# Patient Record
Sex: FEMALE | Race: BLACK OR AFRICAN AMERICAN | NOT HISPANIC OR LATINO
[De-identification: names, ages, dates, MRNs, and addresses within clinical notes are randomized per-mention and may not be internally consistent; named-entity substitution may affect disease eponyms.]

---

## 2021-02-09 ENCOUNTER — RESULT REVIEW (OUTPATIENT)
Age: 81
End: 2021-02-09

## 2021-02-09 ENCOUNTER — INPATIENT (INPATIENT)
Facility: HOSPITAL | Age: 81
LOS: 5 days | Discharge: HOME | End: 2021-02-15
Attending: STUDENT IN AN ORGANIZED HEALTH CARE EDUCATION/TRAINING PROGRAM | Admitting: STUDENT IN AN ORGANIZED HEALTH CARE EDUCATION/TRAINING PROGRAM
Payer: MEDICARE

## 2021-02-09 VITALS
HEART RATE: 101 BPM | HEIGHT: 61 IN | TEMPERATURE: 99 F | OXYGEN SATURATION: 97 % | RESPIRATION RATE: 18 BRPM | DIASTOLIC BLOOD PRESSURE: 97 MMHG | WEIGHT: 130.07 LBS | SYSTOLIC BLOOD PRESSURE: 124 MMHG

## 2021-02-09 DIAGNOSIS — K56.609 UNSPECIFIED INTESTINAL OBSTRUCTION, UNSPECIFIED AS TO PARTIAL VERSUS COMPLETE OBSTRUCTION: ICD-10-CM

## 2021-02-09 DIAGNOSIS — K56.50 INTESTINAL ADHESIONS [BANDS], UNSPECIFIED AS TO PARTIAL VERSUS COMPLETE OBSTRUCTION: ICD-10-CM

## 2021-02-09 DIAGNOSIS — A41.9 SEPSIS, UNSPECIFIED ORGANISM: ICD-10-CM

## 2021-02-09 DIAGNOSIS — N17.9 ACUTE KIDNEY FAILURE, UNSPECIFIED: ICD-10-CM

## 2021-02-09 DIAGNOSIS — N60.09 SOLITARY CYST OF UNSPECIFIED BREAST: Chronic | ICD-10-CM

## 2021-02-09 DIAGNOSIS — I10 ESSENTIAL (PRIMARY) HYPERTENSION: ICD-10-CM

## 2021-02-09 DIAGNOSIS — Z90.710 ACQUIRED ABSENCE OF BOTH CERVIX AND UTERUS: ICD-10-CM

## 2021-02-09 DIAGNOSIS — E11.9 TYPE 2 DIABETES MELLITUS WITHOUT COMPLICATIONS: ICD-10-CM

## 2021-02-09 DIAGNOSIS — Z90.710 ACQUIRED ABSENCE OF BOTH CERVIX AND UTERUS: Chronic | ICD-10-CM

## 2021-02-09 DIAGNOSIS — Z29.9 ENCOUNTER FOR PROPHYLACTIC MEASURES, UNSPECIFIED: ICD-10-CM

## 2021-02-09 DIAGNOSIS — Z79.84 LONG TERM (CURRENT) USE OF ORAL HYPOGLYCEMIC DRUGS: ICD-10-CM

## 2021-02-09 DIAGNOSIS — R18.8 OTHER ASCITES: ICD-10-CM

## 2021-02-09 DIAGNOSIS — K35.30 ACUTE APPENDICITIS WITH LOCALIZED PERITONITIS, WITHOUT PERFORATION OR GANGRENE: ICD-10-CM

## 2021-02-09 DIAGNOSIS — Z88.6 ALLERGY STATUS TO ANALGESIC AGENT: ICD-10-CM

## 2021-02-09 LAB
ALBUMIN SERPL ELPH-MCNC: 4.7 G/DL — SIGNIFICANT CHANGE UP (ref 3.3–5)
ALP SERPL-CCNC: 141 U/L — HIGH (ref 40–120)
ALT FLD-CCNC: 24 U/L — SIGNIFICANT CHANGE UP (ref 12–78)
ANION GAP SERPL CALC-SCNC: 8 MMOL/L — SIGNIFICANT CHANGE UP (ref 5–17)
ANION GAP SERPL CALC-SCNC: 8 MMOL/L — SIGNIFICANT CHANGE UP (ref 5–17)
APPEARANCE UR: CLEAR — SIGNIFICANT CHANGE UP
APTT BLD: 29.5 SEC — SIGNIFICANT CHANGE UP (ref 27.5–35.5)
AST SERPL-CCNC: 18 U/L — SIGNIFICANT CHANGE UP (ref 15–37)
BACTERIA # UR AUTO: ABNORMAL
BASOPHILS # BLD AUTO: 0.02 K/UL — SIGNIFICANT CHANGE UP (ref 0–0.2)
BASOPHILS NFR BLD AUTO: 0.2 % — SIGNIFICANT CHANGE UP (ref 0–2)
BILIRUB SERPL-MCNC: 0.6 MG/DL — SIGNIFICANT CHANGE UP (ref 0.2–1.2)
BILIRUB UR-MCNC: ABNORMAL
BLD GP AB SCN SERPL QL: SIGNIFICANT CHANGE UP
BUN SERPL-MCNC: 44 MG/DL — HIGH (ref 7–23)
BUN SERPL-MCNC: 48 MG/DL — HIGH (ref 7–23)
CALCIUM SERPL-MCNC: 11.3 MG/DL — HIGH (ref 8.5–10.1)
CALCIUM SERPL-MCNC: 8.3 MG/DL — LOW (ref 8.5–10.1)
CHLORIDE SERPL-SCNC: 112 MMOL/L — HIGH (ref 96–108)
CHLORIDE SERPL-SCNC: 98 MMOL/L — SIGNIFICANT CHANGE UP (ref 96–108)
CO2 SERPL-SCNC: 26 MMOL/L — SIGNIFICANT CHANGE UP (ref 22–31)
CO2 SERPL-SCNC: 34 MMOL/L — HIGH (ref 22–31)
COLOR SPEC: YELLOW — SIGNIFICANT CHANGE UP
CREAT SERPL-MCNC: 1.62 MG/DL — HIGH (ref 0.5–1.3)
CREAT SERPL-MCNC: 2.39 MG/DL — HIGH (ref 0.5–1.3)
DIFF PNL FLD: ABNORMAL
EOSINOPHIL # BLD AUTO: 0 K/UL — SIGNIFICANT CHANGE UP (ref 0–0.5)
EOSINOPHIL NFR BLD AUTO: 0 % — SIGNIFICANT CHANGE UP (ref 0–6)
EPI CELLS # UR: SIGNIFICANT CHANGE UP
FLUAV AG NPH QL: SIGNIFICANT CHANGE UP
FLUBV AG NPH QL: SIGNIFICANT CHANGE UP
GLUCOSE BLDC GLUCOMTR-MCNC: 146 MG/DL — HIGH (ref 70–99)
GLUCOSE SERPL-MCNC: 134 MG/DL — HIGH (ref 70–99)
GLUCOSE SERPL-MCNC: 175 MG/DL — HIGH (ref 70–99)
GLUCOSE UR QL: NEGATIVE MG/DL — SIGNIFICANT CHANGE UP
HCT VFR BLD CALC: 48 % — HIGH (ref 34.5–45)
HGB BLD-MCNC: 15.9 G/DL — HIGH (ref 11.5–15.5)
IMM GRANULOCYTES NFR BLD AUTO: 0.4 % — SIGNIFICANT CHANGE UP (ref 0–1.5)
INR BLD: 1.14 RATIO — SIGNIFICANT CHANGE UP (ref 0.88–1.16)
KETONES UR-MCNC: NEGATIVE — SIGNIFICANT CHANGE UP
LACTATE SERPL-SCNC: 1.4 MMOL/L — SIGNIFICANT CHANGE UP (ref 0.7–2)
LEUKOCYTE ESTERASE UR-ACNC: ABNORMAL
LIDOCAIN IGE QN: 46 U/L — LOW (ref 73–393)
LYMPHOCYTES # BLD AUTO: 0.62 K/UL — LOW (ref 1–3.3)
LYMPHOCYTES # BLD AUTO: 5 % — LOW (ref 13–44)
MAGNESIUM SERPL-MCNC: 2.4 MG/DL — SIGNIFICANT CHANGE UP (ref 1.6–2.6)
MCHC RBC-ENTMCNC: 24.9 PG — LOW (ref 27–34)
MCHC RBC-ENTMCNC: 33.1 GM/DL — SIGNIFICANT CHANGE UP (ref 32–36)
MCV RBC AUTO: 75.2 FL — LOW (ref 80–100)
MONOCYTES # BLD AUTO: 1.01 K/UL — HIGH (ref 0–0.9)
MONOCYTES NFR BLD AUTO: 8.2 % — SIGNIFICANT CHANGE UP (ref 2–14)
NEUTROPHILS # BLD AUTO: 10.65 K/UL — HIGH (ref 1.8–7.4)
NEUTROPHILS NFR BLD AUTO: 86.2 % — HIGH (ref 43–77)
NITRITE UR-MCNC: NEGATIVE — SIGNIFICANT CHANGE UP
NRBC # BLD: 0 /100 WBCS — SIGNIFICANT CHANGE UP (ref 0–0)
PH UR: 5 — SIGNIFICANT CHANGE UP (ref 5–8)
PHOSPHATE SERPL-MCNC: 4.4 MG/DL — SIGNIFICANT CHANGE UP (ref 2.5–4.5)
PLATELET # BLD AUTO: 369 K/UL — SIGNIFICANT CHANGE UP (ref 150–400)
POTASSIUM SERPL-MCNC: 3.2 MMOL/L — LOW (ref 3.5–5.3)
POTASSIUM SERPL-MCNC: 3.7 MMOL/L — SIGNIFICANT CHANGE UP (ref 3.5–5.3)
POTASSIUM SERPL-SCNC: 3.2 MMOL/L — LOW (ref 3.5–5.3)
POTASSIUM SERPL-SCNC: 3.7 MMOL/L — SIGNIFICANT CHANGE UP (ref 3.5–5.3)
PROT SERPL-MCNC: 9.5 GM/DL — HIGH (ref 6–8.3)
PROT UR-MCNC: 100 MG/DL
PROTHROM AB SERPL-ACNC: 13.1 SEC — SIGNIFICANT CHANGE UP (ref 10.6–13.6)
RBC # BLD: 6.38 M/UL — HIGH (ref 3.8–5.2)
RBC # FLD: 15.4 % — HIGH (ref 10.3–14.5)
RBC CASTS # UR COMP ASSIST: SIGNIFICANT CHANGE UP /HPF (ref 0–4)
SARS-COV-2 RNA SPEC QL NAA+PROBE: SIGNIFICANT CHANGE UP
SODIUM SERPL-SCNC: 140 MMOL/L — SIGNIFICANT CHANGE UP (ref 135–145)
SODIUM SERPL-SCNC: 146 MMOL/L — HIGH (ref 135–145)
SP GR SPEC: 1.02 — SIGNIFICANT CHANGE UP (ref 1.01–1.02)
UROBILINOGEN FLD QL: NEGATIVE MG/DL — SIGNIFICANT CHANGE UP
WBC # BLD: 12.35 K/UL — HIGH (ref 3.8–10.5)
WBC # FLD AUTO: 12.35 K/UL — HIGH (ref 3.8–10.5)
WBC UR QL: SIGNIFICANT CHANGE UP

## 2021-02-09 PROCEDURE — 44950 APPENDECTOMY: CPT | Mod: AS

## 2021-02-09 PROCEDURE — 43752 NASAL/OROGASTRIC W/TUBE PLMT: CPT

## 2021-02-09 PROCEDURE — 74176 CT ABD & PELVIS W/O CONTRAST: CPT | Mod: 26,MA

## 2021-02-09 PROCEDURE — 99285 EMERGENCY DEPT VISIT HI MDM: CPT

## 2021-02-09 PROCEDURE — 99221 1ST HOSP IP/OBS SF/LOW 40: CPT | Mod: 57

## 2021-02-09 PROCEDURE — 71045 X-RAY EXAM CHEST 1 VIEW: CPT | Mod: 26

## 2021-02-09 PROCEDURE — 88304 TISSUE EXAM BY PATHOLOGIST: CPT | Mod: 26

## 2021-02-09 PROCEDURE — 93010 ELECTROCARDIOGRAM REPORT: CPT

## 2021-02-09 PROCEDURE — 44005 FREEING OF BOWEL ADHESION: CPT | Mod: AS

## 2021-02-09 PROCEDURE — 44950 APPENDECTOMY: CPT

## 2021-02-09 RX ORDER — DEXTROSE 50 % IN WATER 50 %
25 SYRINGE (ML) INTRAVENOUS ONCE
Refills: 0 | Status: DISCONTINUED | OUTPATIENT
Start: 2021-02-09 | End: 2021-02-15

## 2021-02-09 RX ORDER — SODIUM CHLORIDE 9 MG/ML
1000 INJECTION, SOLUTION INTRAVENOUS
Refills: 0 | Status: DISCONTINUED | OUTPATIENT
Start: 2021-02-09 | End: 2021-02-09

## 2021-02-09 RX ORDER — ONDANSETRON 8 MG/1
4 TABLET, FILM COATED ORAL EVERY 6 HOURS
Refills: 0 | Status: DISCONTINUED | OUTPATIENT
Start: 2021-02-09 | End: 2021-02-15

## 2021-02-09 RX ORDER — ONDANSETRON 8 MG/1
4 TABLET, FILM COATED ORAL ONCE
Refills: 0 | Status: DISCONTINUED | OUTPATIENT
Start: 2021-02-09 | End: 2021-02-10

## 2021-02-09 RX ORDER — DEXTROSE 50 % IN WATER 50 %
12.5 SYRINGE (ML) INTRAVENOUS ONCE
Refills: 0 | Status: DISCONTINUED | OUTPATIENT
Start: 2021-02-09 | End: 2021-02-15

## 2021-02-09 RX ORDER — ONDANSETRON 8 MG/1
4 TABLET, FILM COATED ORAL EVERY 6 HOURS
Refills: 0 | Status: DISCONTINUED | OUTPATIENT
Start: 2021-02-09 | End: 2021-02-09

## 2021-02-09 RX ORDER — FENTANYL CITRATE 50 UG/ML
50 INJECTION INTRAVENOUS ONCE
Refills: 0 | Status: COMPLETED | OUTPATIENT
Start: 2021-02-09 | End: 2021-02-09

## 2021-02-09 RX ORDER — DEXTROSE MONOHYDRATE, SODIUM CHLORIDE, AND POTASSIUM CHLORIDE 50; .745; 4.5 G/1000ML; G/1000ML; G/1000ML
1000 INJECTION, SOLUTION INTRAVENOUS
Refills: 0 | Status: DISCONTINUED | OUTPATIENT
Start: 2021-02-09 | End: 2021-02-14

## 2021-02-09 RX ORDER — GLUCAGON INJECTION, SOLUTION 0.5 MG/.1ML
1 INJECTION, SOLUTION SUBCUTANEOUS ONCE
Refills: 0 | Status: DISCONTINUED | OUTPATIENT
Start: 2021-02-09 | End: 2021-02-15

## 2021-02-09 RX ORDER — INSULIN LISPRO 100/ML
VIAL (ML) SUBCUTANEOUS
Refills: 0 | Status: DISCONTINUED | OUTPATIENT
Start: 2021-02-09 | End: 2021-02-15

## 2021-02-09 RX ORDER — ONDANSETRON 8 MG/1
4 TABLET, FILM COATED ORAL ONCE
Refills: 0 | Status: COMPLETED | OUTPATIENT
Start: 2021-02-09 | End: 2021-02-09

## 2021-02-09 RX ORDER — MORPHINE SULFATE 50 MG/1
2 CAPSULE, EXTENDED RELEASE ORAL EVERY 4 HOURS
Refills: 0 | Status: DISCONTINUED | OUTPATIENT
Start: 2021-02-09 | End: 2021-02-14

## 2021-02-09 RX ORDER — ACETAMINOPHEN 500 MG
1000 TABLET ORAL ONCE
Refills: 0 | Status: COMPLETED | OUTPATIENT
Start: 2021-02-09 | End: 2021-02-09

## 2021-02-09 RX ORDER — ACETAMINOPHEN 500 MG
1000 TABLET ORAL ONCE
Refills: 0 | Status: COMPLETED | OUTPATIENT
Start: 2021-02-09 | End: 2021-02-10

## 2021-02-09 RX ORDER — SODIUM CHLORIDE 9 MG/ML
1000 INJECTION, SOLUTION INTRAVENOUS
Refills: 0 | Status: DISCONTINUED | OUTPATIENT
Start: 2021-02-09 | End: 2021-02-15

## 2021-02-09 RX ORDER — SODIUM CHLORIDE 9 MG/ML
1000 INJECTION, SOLUTION INTRAVENOUS ONCE
Refills: 0 | Status: COMPLETED | OUTPATIENT
Start: 2021-02-09 | End: 2021-02-09

## 2021-02-09 RX ORDER — PANTOPRAZOLE SODIUM 20 MG/1
40 TABLET, DELAYED RELEASE ORAL DAILY
Refills: 0 | Status: DISCONTINUED | OUTPATIENT
Start: 2021-02-09 | End: 2021-02-09

## 2021-02-09 RX ORDER — SODIUM CHLORIDE 9 MG/ML
1000 INJECTION INTRAMUSCULAR; INTRAVENOUS; SUBCUTANEOUS ONCE
Refills: 0 | Status: COMPLETED | OUTPATIENT
Start: 2021-02-09 | End: 2021-02-09

## 2021-02-09 RX ORDER — PIPERACILLIN AND TAZOBACTAM 4; .5 G/20ML; G/20ML
3.38 INJECTION, POWDER, LYOPHILIZED, FOR SOLUTION INTRAVENOUS ONCE
Refills: 0 | Status: COMPLETED | OUTPATIENT
Start: 2021-02-09 | End: 2021-02-09

## 2021-02-09 RX ORDER — INSULIN LISPRO 100/ML
VIAL (ML) SUBCUTANEOUS AT BEDTIME
Refills: 0 | Status: DISCONTINUED | OUTPATIENT
Start: 2021-02-09 | End: 2021-02-15

## 2021-02-09 RX ORDER — SODIUM CHLORIDE 9 MG/ML
1000 INJECTION, SOLUTION INTRAVENOUS
Refills: 0 | Status: DISCONTINUED | OUTPATIENT
Start: 2021-02-09 | End: 2021-02-10

## 2021-02-09 RX ORDER — KETOROLAC TROMETHAMINE 30 MG/ML
30 SYRINGE (ML) INJECTION ONCE
Refills: 0 | Status: DISCONTINUED | OUTPATIENT
Start: 2021-02-09 | End: 2021-02-09

## 2021-02-09 RX ORDER — DEXTROSE 50 % IN WATER 50 %
15 SYRINGE (ML) INTRAVENOUS ONCE
Refills: 0 | Status: DISCONTINUED | OUTPATIENT
Start: 2021-02-09 | End: 2021-02-15

## 2021-02-09 RX ORDER — FENTANYL CITRATE 50 UG/ML
25 INJECTION INTRAVENOUS
Refills: 0 | Status: DISCONTINUED | OUTPATIENT
Start: 2021-02-09 | End: 2021-02-10

## 2021-02-09 RX ORDER — PIPERACILLIN AND TAZOBACTAM 4; .5 G/20ML; G/20ML
3.38 INJECTION, POWDER, LYOPHILIZED, FOR SOLUTION INTRAVENOUS EVERY 8 HOURS
Refills: 0 | Status: DISCONTINUED | OUTPATIENT
Start: 2021-02-09 | End: 2021-02-10

## 2021-02-09 RX ORDER — SODIUM CHLORIDE 9 MG/ML
1000 INJECTION INTRAMUSCULAR; INTRAVENOUS; SUBCUTANEOUS ONCE
Refills: 0 | Status: DISCONTINUED | OUTPATIENT
Start: 2021-02-09 | End: 2021-02-09

## 2021-02-09 RX ORDER — HEPARIN SODIUM 5000 [USP'U]/ML
5000 INJECTION INTRAVENOUS; SUBCUTANEOUS EVERY 12 HOURS
Refills: 0 | Status: DISCONTINUED | OUTPATIENT
Start: 2021-02-09 | End: 2021-02-15

## 2021-02-09 RX ORDER — PANTOPRAZOLE SODIUM 20 MG/1
40 TABLET, DELAYED RELEASE ORAL DAILY
Refills: 0 | Status: DISCONTINUED | OUTPATIENT
Start: 2021-02-09 | End: 2021-02-15

## 2021-02-09 RX ADMIN — SODIUM CHLORIDE 1000 MILLILITER(S): 9 INJECTION, SOLUTION INTRAVENOUS at 20:50

## 2021-02-09 RX ADMIN — SODIUM CHLORIDE 50 MILLILITER(S): 9 INJECTION, SOLUTION INTRAVENOUS at 23:50

## 2021-02-09 RX ADMIN — FENTANYL CITRATE 25 MICROGRAM(S): 50 INJECTION INTRAVENOUS at 23:45

## 2021-02-09 RX ADMIN — ONDANSETRON 4 MILLIGRAM(S): 8 TABLET, FILM COATED ORAL at 14:01

## 2021-02-09 RX ADMIN — PIPERACILLIN AND TAZOBACTAM 200 GRAM(S): 4; .5 INJECTION, POWDER, LYOPHILIZED, FOR SOLUTION INTRAVENOUS at 21:35

## 2021-02-09 RX ADMIN — SODIUM CHLORIDE 1000 MILLILITER(S): 9 INJECTION, SOLUTION INTRAVENOUS at 20:45

## 2021-02-09 RX ADMIN — SODIUM CHLORIDE 1000 MILLILITER(S): 9 INJECTION INTRAMUSCULAR; INTRAVENOUS; SUBCUTANEOUS at 15:00

## 2021-02-09 RX ADMIN — Medication 400 MILLIGRAM(S): at 15:54

## 2021-02-09 RX ADMIN — SODIUM CHLORIDE 1000 MILLILITER(S): 9 INJECTION INTRAMUSCULAR; INTRAVENOUS; SUBCUTANEOUS at 14:02

## 2021-02-09 NOTE — H&P ADULT - ATTENDING COMMENTS
I saw and examined the patient who has tender abdomen with rebound/guarding. I reviewed laboratory findings which demonstrated leukocystosis, acute kidney injury, imaging demonstrates evidence of closed-loop small bowel obstruction. Given these findings I spoke with the patient and offered her exploratory laparotomy, possible bowel resection, possible ostomy, and all indicated procedures. We discussed the indications, risks, benefits, and alternatives of the procedure, to which the patient stated she understood, gave consent, and all her questions were answered.

## 2021-02-09 NOTE — ED ADULT TRIAGE NOTE - CHIEF COMPLAINT QUOTE
abdominal pain, nausea vomiting and diarrhea x 1 day after having chinese food,. unable to tolerate po fluids  in the field

## 2021-02-09 NOTE — H&P ADULT - ASSESSMENT
79 y/o female PMHx HTN, DM, fibroids s/p hysterectomy in 1980s, b/l breast cyst removal presents with nausea, vomiting, upper abdominal pain for 3-4 days. Admitted with closed loop SBO.

## 2021-02-09 NOTE — H&P ADULT - HISTORY OF PRESENT ILLNESS
81 y/o female PMHx HTN, DM, fibroids s/p hysterectomy in 1980s, b/l breast cyst removal presents with nausea, vomiting, upper abdominal pain for 3-4 days. Last BM this AM was small. Last flatus was this morning. Last ate 2 days ago. Patient denies fever, chills, constipation, diarrhea, melena, hematochezia, dysuria, hematuria, chest pain, shortness of breath, dizziness, cough. Patient denies prior incident of SBO.

## 2021-02-09 NOTE — ED ADULT NURSE REASSESSMENT NOTE - NS ED NURSE REASSESS COMMENT FT1
NG tube placed by surgery, draining bright red emesis. Pt verbalizes improvement in symptoms. Labs drawn and sent, second yuliya placed. IVF and IV abx infusing. Belongings secured with security. VSS. Report to Marti in OR. To OR with tech and surgical resident Nahum.

## 2021-02-09 NOTE — H&P ADULT - NSHPPHYSICALEXAM_GEN_ALL_CORE
PHYSICAL EXAM:  GENERAL: NAD, well-developed  HEAD:  Atraumatic, Normocephalic  EYES: Conjunctiva and sclera clear  ENMT: No tonsillar erythema, exudates, or enlargement; Moist mucous membranes, No lesions  NECK: Supple, No JVD, Normal thyroid  NERVOUS SYSTEM:  Alert & Oriented X3  CHEST/LUNG: Clear to auscultation bilaterally; No rales, rhonchi, wheezing  HEART: Regular rate and rhythm. S1S2  ABDOMEN: Nondistended, hypoactive BS, soft, diffuse tenderness, lower abdominal guarding   EXTREMITIES:  2+ Peripheral Pulses, No clubbing, cyanosis, or edema

## 2021-02-09 NOTE — ED PROVIDER NOTE - OBJECTIVE STATEMENT
This patient is a 80 year old woman hx of DM who presents to the ER c/o abdominal pain, nausea and vomiting x 2 days.  Patient reports pain along her entire upper abdomen described as an intermittent sharp pain 9/10 in severity.  She denies sick contacts.  She does report eating Chinese Food a few hours prior to onset of symptoms.  She denies fever and diarrhea.  She tried taking milk of magnesia but was not able to tolerate anything by mouth.

## 2021-02-09 NOTE — ED PROVIDER NOTE - CLINICAL SUMMARY MEDICAL DECISION MAKING FREE TEXT BOX
80 year old with vomiting and upper abdominal pain.  Patient with LUQ and epigastric tenderness without guarding.  Slightly elevated WBC no elevated lactate.  CT shows SBO will place NGT.  General surgery consulted and patient admitted.

## 2021-02-09 NOTE — H&P ADULT - NSICDXPASTSURGICALHX_GEN_ALL_CORE_FT
PAST SURGICAL HISTORY:  Breast cyst removal b/l    H/O abdominal hysterectomy for fibroids in 1980s

## 2021-02-09 NOTE — H&P ADULT - PROBLEM SELECTOR PLAN 1
-Admit patient   -Emergent OR booked  -Pre-op labs, NPO, NGT to LWS, IV hydration  -Xray to confirm NGT placement  -Hernández  -Pain management prn  -Anti-emetic prn  -GI ppx  -DVT ppx  -2 units of PRBC on hold for the OR  -CXR, EKG  -ICU notified about patient. Discussed with Dr. Dill.  -Discussed with Dr. Brush -Admit patient   -Emergent OR booked  -Pre-op labs, NPO, NGT to LWS, IV hydration  -Xray to confirm NGT placement  -Hernández  -Pain management prn  -Anti-emetic prn  -GI ppx  -DVT ppx  -2 units of PRBC on hold for the OR  -CXR, EKG  -ICU notified about patient. Discussed with Dr. Dill.  -Discussed plan with patient as well as her daughter on the phone [phone].  -Discussed with Dr. Brush

## 2021-02-09 NOTE — ED ADULT NURSE NOTE - PRO INTERPRETER NEED 2
Subjective:     History of Present Illness:  Heather Cruz is a 12 y.o. female who presents to the clinic today for urine issues x 1 wk (brought by mom - Michelle)     History was provided by the patient and mother. Pt was last seen on 7/10/2018.  Heather complains of darkness to urine off and on for the last 10 days. No pain with urination. No constipation. No h/o UTI. Afebrile. No known injury at all. No menarche yet. Normal BP    Review of Systems   Constitutional: Negative.    Gastrointestinal: Negative for abdominal pain, blood in stool and constipation.   Genitourinary: Positive for hematuria. Negative for decreased urine volume, dysuria, menstrual problem, urgency and vaginal bleeding.       Objective:     Physical Exam   Constitutional: She appears well-developed and well-nourished. She is active.   Cardiovascular: Normal rate and regular rhythm.   Pulmonary/Chest: Effort normal and breath sounds normal.   Abdominal: Soft. Bowel sounds are normal.   Neurological: She is alert.   Skin: Skin is warm and dry.   Vitals reviewed.      Assessment and Plan:     Hematuria, unspecified type  -     Urinalysis  -     Urine culture      Will follow up labs    Follow-up if symptoms worsen or fail to improve.   English

## 2021-02-09 NOTE — ED PROVIDER NOTE - CONSTITUTIONAL, MLM
normal... Well appearing, awake, alert, oriented to person, place, time/situation and in no apparent distress. Uncomfortable appearing, awake, alert, oriented to person, place, time/situation and in no apparent distress.

## 2021-02-09 NOTE — BRIEF OPERATIVE NOTE - NSICDXBRIEFPOSTOP_GEN_ALL_CORE_FT
POST-OP DIAGNOSIS:  Acute appendicitis 09-Feb-2021 23:31:55  Adriana Sidhu  Small bowel obstruction 09-Feb-2021 23:31:17 Closed loop SBO Adriana Sidhu

## 2021-02-09 NOTE — H&P ADULT - NSHPLABSRESULTS_GEN_ALL_CORE
15.9   12.35 )-----------( 369      ( 09 Feb 2021 14:14 )             48.0   02-09    140  |  98  |  44<H>  ----------------------------<  175<H>  3.7   |  34<H>  |  2.39<H>    Ca    11.3<H>      09 Feb 2021 14:14    TPro  9.5<H>  /  Alb  4.7  /  TBili  0.6  /  DBili  x   /  AST  18  /  ALT  24  /  AlkPhos  141<H>  02-09    < from: CT Abdomen and Pelvis No Cont (02.09.21 @ 19:40) >    FINDINGS: Evaluation of the solid organs and vasculature is limited in the absence of intravenous contrast.  LOWER CHEST: Within normal limits.    LIVER: Within normal limits.  BILE DUCTS: Normal caliber.  GALLBLADDER: Within normal limits.  SPLEEN: Within normal limits.  PANCREAS: Within normal limits.  ADRENALS: Within normal limits.  KIDNEYS/URETERS: Numerous bilateral nonobstructing small renal calculi measuring up to 4 mm in the left and 2 mm in the right kidney. No hydronephrosis.    BLADDER: Underdistended but grossly unremarkable.  REPRODUCTIVE ORGANS: Hysterectomy. No adnexal mass.    BOWEL: Diffusely dilated, fluid-filled small bowel with 2 adjacent transition points in the right lower quadrant involving terminal ileum and distal ileum (2, 82), highly suspicious for closed loop obstruction. Associated mesenteric edema noted.. Appendix is normal.  PERITONEUM:No ascites or pneumoperitoneum.  VESSELS: Atherosclerotic changes.  RETROPERITONEUM/LYMPH NODES: No lymphadenopathy.  ABDOMINAL WALL: Within normal limits.  BONES: Degenerative changes and scoliosis.    IMPRESSION:  Findings highly suspicious for closed loop small bowel obstruction, with two adjacent transition points in the right lower quadrant, ?due to adhesive band. Associated mesenteric edema noted.    < end of copied text >

## 2021-02-09 NOTE — BRIEF OPERATIVE NOTE - NSICDXBRIEFPROCEDURE_GEN_ALL_CORE_FT
PROCEDURES:  Peritoneal lavage 09-Feb-2021 23:30:41  Adriana Sidhu  Appendectomy 09-Feb-2021 23:30:29  Adriana Sidhu  Lysis of intestinal adhesions 09-Feb-2021 23:30:22  Adriana Sidhu  Exploratory laparotomy 09-Feb-2021 23:30:12  Adriana Sidhu

## 2021-02-10 LAB
A1C WITH ESTIMATED AVERAGE GLUCOSE RESULT: 5.5 % — SIGNIFICANT CHANGE UP (ref 4–5.6)
ANION GAP SERPL CALC-SCNC: 4 MMOL/L — LOW (ref 5–17)
BUN SERPL-MCNC: 41 MG/DL — HIGH (ref 7–23)
CALCIUM SERPL-MCNC: 8.3 MG/DL — LOW (ref 8.5–10.1)
CHLORIDE SERPL-SCNC: 108 MMOL/L — SIGNIFICANT CHANGE UP (ref 96–108)
CO2 SERPL-SCNC: 31 MMOL/L — SIGNIFICANT CHANGE UP (ref 22–31)
CREAT SERPL-MCNC: 1.44 MG/DL — HIGH (ref 0.5–1.3)
ESTIMATED AVERAGE GLUCOSE: 111 MG/DL — SIGNIFICANT CHANGE UP (ref 68–114)
GLUCOSE SERPL-MCNC: 142 MG/DL — HIGH (ref 70–99)
HCT VFR BLD CALC: 37.6 % — SIGNIFICANT CHANGE UP (ref 34.5–45)
HCT VFR BLD CALC: 37.7 % — SIGNIFICANT CHANGE UP (ref 34.5–45)
HGB BLD-MCNC: 12.4 G/DL — SIGNIFICANT CHANGE UP (ref 11.5–15.5)
HGB BLD-MCNC: 12.6 G/DL — SIGNIFICANT CHANGE UP (ref 11.5–15.5)
MAGNESIUM SERPL-MCNC: 2.9 MG/DL — HIGH (ref 1.6–2.6)
MCHC RBC-ENTMCNC: 24.9 PG — LOW (ref 27–34)
MCHC RBC-ENTMCNC: 25 PG — LOW (ref 27–34)
MCHC RBC-ENTMCNC: 32.9 GM/DL — SIGNIFICANT CHANGE UP (ref 32–36)
MCHC RBC-ENTMCNC: 33.5 GM/DL — SIGNIFICANT CHANGE UP (ref 32–36)
MCV RBC AUTO: 74.5 FL — LOW (ref 80–100)
MCV RBC AUTO: 75.9 FL — LOW (ref 80–100)
NRBC # BLD: 0 /100 WBCS — SIGNIFICANT CHANGE UP (ref 0–0)
NRBC # BLD: 0 /100 WBCS — SIGNIFICANT CHANGE UP (ref 0–0)
PHOSPHATE SERPL-MCNC: 4.2 MG/DL — SIGNIFICANT CHANGE UP (ref 2.5–4.5)
PLATELET # BLD AUTO: 298 K/UL — SIGNIFICANT CHANGE UP (ref 150–400)
PLATELET # BLD AUTO: 301 K/UL — SIGNIFICANT CHANGE UP (ref 150–400)
POTASSIUM SERPL-MCNC: 3.5 MMOL/L — SIGNIFICANT CHANGE UP (ref 3.5–5.3)
POTASSIUM SERPL-SCNC: 3.5 MMOL/L — SIGNIFICANT CHANGE UP (ref 3.5–5.3)
RBC # BLD: 4.97 M/UL — SIGNIFICANT CHANGE UP (ref 3.8–5.2)
RBC # BLD: 5.05 M/UL — SIGNIFICANT CHANGE UP (ref 3.8–5.2)
RBC # FLD: 14.8 % — HIGH (ref 10.3–14.5)
RBC # FLD: 15 % — HIGH (ref 10.3–14.5)
SARS-COV-2 IGG SERPL QL IA: POSITIVE
SARS-COV-2 IGM SERPL IA-ACNC: 2.95 INDEX — HIGH
SODIUM SERPL-SCNC: 143 MMOL/L — SIGNIFICANT CHANGE UP (ref 135–145)
WBC # BLD: 5.45 K/UL — SIGNIFICANT CHANGE UP (ref 3.8–10.5)
WBC # BLD: 6.05 K/UL — SIGNIFICANT CHANGE UP (ref 3.8–10.5)
WBC # FLD AUTO: 5.45 K/UL — SIGNIFICANT CHANGE UP (ref 3.8–10.5)
WBC # FLD AUTO: 6.05 K/UL — SIGNIFICANT CHANGE UP (ref 3.8–10.5)

## 2021-02-10 RX ORDER — PIPERACILLIN AND TAZOBACTAM 4; .5 G/20ML; G/20ML
3.38 INJECTION, POWDER, LYOPHILIZED, FOR SOLUTION INTRAVENOUS EVERY 12 HOURS
Refills: 0 | Status: DISCONTINUED | OUTPATIENT
Start: 2021-02-10 | End: 2021-02-11

## 2021-02-10 RX ORDER — POTASSIUM CHLORIDE 20 MEQ
10 PACKET (EA) ORAL
Refills: 0 | Status: COMPLETED | OUTPATIENT
Start: 2021-02-10 | End: 2021-02-10

## 2021-02-10 RX ADMIN — Medication 100 MILLIEQUIVALENT(S): at 01:03

## 2021-02-10 RX ADMIN — PANTOPRAZOLE SODIUM 40 MILLIGRAM(S): 20 TABLET, DELAYED RELEASE ORAL at 11:01

## 2021-02-10 RX ADMIN — PIPERACILLIN AND TAZOBACTAM 25 GRAM(S): 4; .5 INJECTION, POWDER, LYOPHILIZED, FOR SOLUTION INTRAVENOUS at 06:20

## 2021-02-10 RX ADMIN — Medication 1: at 08:32

## 2021-02-10 RX ADMIN — MORPHINE SULFATE 2 MILLIGRAM(S): 50 CAPSULE, EXTENDED RELEASE ORAL at 21:34

## 2021-02-10 RX ADMIN — DEXTROSE MONOHYDRATE, SODIUM CHLORIDE, AND POTASSIUM CHLORIDE 120 MILLILITER(S): 50; .745; 4.5 INJECTION, SOLUTION INTRAVENOUS at 21:37

## 2021-02-10 RX ADMIN — PIPERACILLIN AND TAZOBACTAM 25 GRAM(S): 4; .5 INJECTION, POWDER, LYOPHILIZED, FOR SOLUTION INTRAVENOUS at 18:02

## 2021-02-10 RX ADMIN — DEXTROSE MONOHYDRATE, SODIUM CHLORIDE, AND POTASSIUM CHLORIDE 120 MILLILITER(S): 50; .745; 4.5 INJECTION, SOLUTION INTRAVENOUS at 02:27

## 2021-02-10 RX ADMIN — Medication 100 MILLIEQUIVALENT(S): at 03:48

## 2021-02-10 RX ADMIN — MORPHINE SULFATE 2 MILLIGRAM(S): 50 CAPSULE, EXTENDED RELEASE ORAL at 15:13

## 2021-02-10 RX ADMIN — Medication 400 MILLIGRAM(S): at 06:32

## 2021-02-10 RX ADMIN — HEPARIN SODIUM 5000 UNIT(S): 5000 INJECTION INTRAVENOUS; SUBCUTANEOUS at 18:03

## 2021-02-10 RX ADMIN — HEPARIN SODIUM 5000 UNIT(S): 5000 INJECTION INTRAVENOUS; SUBCUTANEOUS at 06:20

## 2021-02-10 RX ADMIN — Medication 100 MILLIEQUIVALENT(S): at 02:28

## 2021-02-10 NOTE — PHYSICAL THERAPY INITIAL EVALUATION ADULT - ADDITIONAL COMMENTS
Pt reports living in  w/  w/ 3 steps to enter R rail. Pt is left handed and reports 12 more steps w/ b/l reachable steps to the second floor where her bedroom is. Pt repots a shower tub combo w/ fixed shower had, a raised toilet seat and grab bars around both. Pt states that she ws independent in all ADLs and ambulated w/out device prior to admission. Pt reports that she owns straight can and a walker which are her husbands and in good working condition. pt states that her daughter lives near by and will be able to take part in her care if necessary.

## 2021-02-10 NOTE — PHYSICAL THERAPY INITIAL EVALUATION ADULT - GAIT TRAINING, PT EVAL
Pt will be independent in ambulating 500 ft w/out device after 4 wks of PT to return to all ADLs safely

## 2021-02-10 NOTE — PHYSICAL THERAPY INITIAL EVALUATION ADULT - GAIT DEVIATIONS NOTED, PT EVAL
decreased brian/increased time in double stance/decreased velocity of limb motion/decreased step length/decreased stride length

## 2021-02-11 LAB
ANION GAP SERPL CALC-SCNC: 3 MMOL/L — LOW (ref 5–17)
BASOPHILS # BLD AUTO: 0.02 K/UL — SIGNIFICANT CHANGE UP (ref 0–0.2)
BASOPHILS NFR BLD AUTO: 0.3 % — SIGNIFICANT CHANGE UP (ref 0–2)
BUN SERPL-MCNC: 23 MG/DL — SIGNIFICANT CHANGE UP (ref 7–23)
CALCIUM SERPL-MCNC: 8.1 MG/DL — LOW (ref 8.5–10.1)
CHLORIDE SERPL-SCNC: 114 MMOL/L — HIGH (ref 96–108)
CO2 SERPL-SCNC: 30 MMOL/L — SIGNIFICANT CHANGE UP (ref 22–31)
CREAT SERPL-MCNC: 0.86 MG/DL — SIGNIFICANT CHANGE UP (ref 0.5–1.3)
EOSINOPHIL # BLD AUTO: 0.07 K/UL — SIGNIFICANT CHANGE UP (ref 0–0.5)
EOSINOPHIL NFR BLD AUTO: 1.2 % — SIGNIFICANT CHANGE UP (ref 0–6)
GLUCOSE SERPL-MCNC: 128 MG/DL — HIGH (ref 70–99)
HCT VFR BLD CALC: 35.5 % — SIGNIFICANT CHANGE UP (ref 34.5–45)
HGB BLD-MCNC: 11.3 G/DL — LOW (ref 11.5–15.5)
IMM GRANULOCYTES NFR BLD AUTO: 0.3 % — SIGNIFICANT CHANGE UP (ref 0–1.5)
LYMPHOCYTES # BLD AUTO: 0.81 K/UL — LOW (ref 1–3.3)
LYMPHOCYTES # BLD AUTO: 14.1 % — SIGNIFICANT CHANGE UP (ref 13–44)
MAGNESIUM SERPL-MCNC: 2.4 MG/DL — SIGNIFICANT CHANGE UP (ref 1.6–2.6)
MCHC RBC-ENTMCNC: 25.1 PG — LOW (ref 27–34)
MCHC RBC-ENTMCNC: 31.8 GM/DL — LOW (ref 32–36)
MCV RBC AUTO: 78.9 FL — LOW (ref 80–100)
MONOCYTES # BLD AUTO: 0.7 K/UL — SIGNIFICANT CHANGE UP (ref 0–0.9)
MONOCYTES NFR BLD AUTO: 12.2 % — SIGNIFICANT CHANGE UP (ref 2–14)
NEUTROPHILS # BLD AUTO: 4.11 K/UL — SIGNIFICANT CHANGE UP (ref 1.8–7.4)
NEUTROPHILS NFR BLD AUTO: 71.9 % — SIGNIFICANT CHANGE UP (ref 43–77)
NRBC # BLD: 0 /100 WBCS — SIGNIFICANT CHANGE UP (ref 0–0)
PHOSPHATE SERPL-MCNC: 1.5 MG/DL — LOW (ref 2.5–4.5)
PLATELET # BLD AUTO: 246 K/UL — SIGNIFICANT CHANGE UP (ref 150–400)
POTASSIUM SERPL-MCNC: 4 MMOL/L — SIGNIFICANT CHANGE UP (ref 3.5–5.3)
POTASSIUM SERPL-SCNC: 4 MMOL/L — SIGNIFICANT CHANGE UP (ref 3.5–5.3)
RBC # BLD: 4.5 M/UL — SIGNIFICANT CHANGE UP (ref 3.8–5.2)
RBC # FLD: 15.1 % — HIGH (ref 10.3–14.5)
SODIUM SERPL-SCNC: 147 MMOL/L — HIGH (ref 135–145)
WBC # BLD: 5.73 K/UL — SIGNIFICANT CHANGE UP (ref 3.8–10.5)
WBC # FLD AUTO: 5.73 K/UL — SIGNIFICANT CHANGE UP (ref 3.8–10.5)

## 2021-02-11 RX ORDER — POTASSIUM PHOSPHATE, MONOBASIC POTASSIUM PHOSPHATE, DIBASIC 236; 224 MG/ML; MG/ML
15 INJECTION, SOLUTION INTRAVENOUS ONCE
Refills: 0 | Status: COMPLETED | OUTPATIENT
Start: 2021-02-11 | End: 2021-02-11

## 2021-02-11 RX ORDER — METOPROLOL TARTRATE 50 MG
2.5 TABLET ORAL EVERY 6 HOURS
Refills: 0 | Status: DISCONTINUED | OUTPATIENT
Start: 2021-02-11 | End: 2021-02-12

## 2021-02-11 RX ADMIN — DEXTROSE MONOHYDRATE, SODIUM CHLORIDE, AND POTASSIUM CHLORIDE 120 MILLILITER(S): 50; .745; 4.5 INJECTION, SOLUTION INTRAVENOUS at 17:32

## 2021-02-11 RX ADMIN — HEPARIN SODIUM 5000 UNIT(S): 5000 INJECTION INTRAVENOUS; SUBCUTANEOUS at 04:38

## 2021-02-11 RX ADMIN — HEPARIN SODIUM 5000 UNIT(S): 5000 INJECTION INTRAVENOUS; SUBCUTANEOUS at 17:27

## 2021-02-11 RX ADMIN — POTASSIUM PHOSPHATE, MONOBASIC POTASSIUM PHOSPHATE, DIBASIC 62.5 MILLIMOLE(S): 236; 224 INJECTION, SOLUTION INTRAVENOUS at 09:22

## 2021-02-11 RX ADMIN — PIPERACILLIN AND TAZOBACTAM 25 GRAM(S): 4; .5 INJECTION, POWDER, LYOPHILIZED, FOR SOLUTION INTRAVENOUS at 04:37

## 2021-02-11 RX ADMIN — MORPHINE SULFATE 2 MILLIGRAM(S): 50 CAPSULE, EXTENDED RELEASE ORAL at 04:37

## 2021-02-11 RX ADMIN — PANTOPRAZOLE SODIUM 40 MILLIGRAM(S): 20 TABLET, DELAYED RELEASE ORAL at 12:34

## 2021-02-11 RX ADMIN — Medication 2.5 MILLIGRAM(S): at 17:27

## 2021-02-11 RX ADMIN — Medication 2.5 MILLIGRAM(S): at 09:22

## 2021-02-11 RX ADMIN — DEXTROSE MONOHYDRATE, SODIUM CHLORIDE, AND POTASSIUM CHLORIDE 120 MILLILITER(S): 50; .745; 4.5 INJECTION, SOLUTION INTRAVENOUS at 07:59

## 2021-02-11 NOTE — PROGRESS NOTE ADULT - ATTENDING COMMENTS
Ms. Monalisa examined. Overall pain improved. No resumption of gastrointestinal function. NGT minimal output. Abd: softly distended, appropriately tender, aquacel dressin in place. Will continue with NGT to CLWS until GI function. Encourage OOB/ambulation.

## 2021-02-12 LAB
ANION GAP SERPL CALC-SCNC: 5 MMOL/L — SIGNIFICANT CHANGE UP (ref 5–17)
BUN SERPL-MCNC: 10 MG/DL — SIGNIFICANT CHANGE UP (ref 7–23)
CALCIUM SERPL-MCNC: 8.5 MG/DL — SIGNIFICANT CHANGE UP (ref 8.5–10.1)
CHLORIDE SERPL-SCNC: 111 MMOL/L — HIGH (ref 96–108)
CO2 SERPL-SCNC: 29 MMOL/L — SIGNIFICANT CHANGE UP (ref 22–31)
CREAT SERPL-MCNC: 0.65 MG/DL — SIGNIFICANT CHANGE UP (ref 0.5–1.3)
GLUCOSE SERPL-MCNC: 100 MG/DL — HIGH (ref 70–99)
HCT VFR BLD CALC: 35.1 % — SIGNIFICANT CHANGE UP (ref 34.5–45)
HGB BLD-MCNC: 11 G/DL — LOW (ref 11.5–15.5)
MAGNESIUM SERPL-MCNC: 2.1 MG/DL — SIGNIFICANT CHANGE UP (ref 1.6–2.6)
MCHC RBC-ENTMCNC: 25.3 PG — LOW (ref 27–34)
MCHC RBC-ENTMCNC: 31.3 GM/DL — LOW (ref 32–36)
MCV RBC AUTO: 80.7 FL — SIGNIFICANT CHANGE UP (ref 80–100)
NRBC # BLD: 0 /100 WBCS — SIGNIFICANT CHANGE UP (ref 0–0)
PHOSPHATE SERPL-MCNC: 1.7 MG/DL — LOW (ref 2.5–4.5)
PLATELET # BLD AUTO: 234 K/UL — SIGNIFICANT CHANGE UP (ref 150–400)
POTASSIUM SERPL-MCNC: 4.2 MMOL/L — SIGNIFICANT CHANGE UP (ref 3.5–5.3)
POTASSIUM SERPL-SCNC: 4.2 MMOL/L — SIGNIFICANT CHANGE UP (ref 3.5–5.3)
RBC # BLD: 4.35 M/UL — SIGNIFICANT CHANGE UP (ref 3.8–5.2)
RBC # FLD: 14.7 % — HIGH (ref 10.3–14.5)
SODIUM SERPL-SCNC: 145 MMOL/L — SIGNIFICANT CHANGE UP (ref 135–145)
WBC # BLD: 6.2 K/UL — SIGNIFICANT CHANGE UP (ref 3.8–10.5)
WBC # FLD AUTO: 6.2 K/UL — SIGNIFICANT CHANGE UP (ref 3.8–10.5)

## 2021-02-12 RX ORDER — POTASSIUM PHOSPHATE, MONOBASIC POTASSIUM PHOSPHATE, DIBASIC 236; 224 MG/ML; MG/ML
15 INJECTION, SOLUTION INTRAVENOUS ONCE
Refills: 0 | Status: COMPLETED | OUTPATIENT
Start: 2021-02-12 | End: 2021-02-12

## 2021-02-12 RX ORDER — AMLODIPINE BESYLATE 2.5 MG/1
5 TABLET ORAL DAILY
Refills: 0 | Status: DISCONTINUED | OUTPATIENT
Start: 2021-02-12 | End: 2021-02-15

## 2021-02-12 RX ADMIN — HEPARIN SODIUM 5000 UNIT(S): 5000 INJECTION INTRAVENOUS; SUBCUTANEOUS at 05:40

## 2021-02-12 RX ADMIN — AMLODIPINE BESYLATE 5 MILLIGRAM(S): 2.5 TABLET ORAL at 12:47

## 2021-02-12 RX ADMIN — DEXTROSE MONOHYDRATE, SODIUM CHLORIDE, AND POTASSIUM CHLORIDE 120 MILLILITER(S): 50; .745; 4.5 INJECTION, SOLUTION INTRAVENOUS at 05:44

## 2021-02-12 RX ADMIN — PANTOPRAZOLE SODIUM 40 MILLIGRAM(S): 20 TABLET, DELAYED RELEASE ORAL at 11:13

## 2021-02-12 RX ADMIN — HEPARIN SODIUM 5000 UNIT(S): 5000 INJECTION INTRAVENOUS; SUBCUTANEOUS at 16:59

## 2021-02-12 RX ADMIN — POTASSIUM PHOSPHATE, MONOBASIC POTASSIUM PHOSPHATE, DIBASIC 62.5 MILLIMOLE(S): 236; 224 INJECTION, SOLUTION INTRAVENOUS at 12:47

## 2021-02-12 RX ADMIN — DEXTROSE MONOHYDRATE, SODIUM CHLORIDE, AND POTASSIUM CHLORIDE 120 MILLILITER(S): 50; .745; 4.5 INJECTION, SOLUTION INTRAVENOUS at 12:47

## 2021-02-12 RX ADMIN — Medication 2.5 MILLIGRAM(S): at 05:40

## 2021-02-12 RX ADMIN — Medication 10 MILLIGRAM(S): at 11:11

## 2021-02-13 LAB
ANION GAP SERPL CALC-SCNC: 6 MMOL/L — SIGNIFICANT CHANGE UP (ref 5–17)
BUN SERPL-MCNC: 8 MG/DL — SIGNIFICANT CHANGE UP (ref 7–23)
CALCIUM SERPL-MCNC: 8.9 MG/DL — SIGNIFICANT CHANGE UP (ref 8.5–10.1)
CHLORIDE SERPL-SCNC: 108 MMOL/L — SIGNIFICANT CHANGE UP (ref 96–108)
CO2 SERPL-SCNC: 26 MMOL/L — SIGNIFICANT CHANGE UP (ref 22–31)
CREAT SERPL-MCNC: 0.64 MG/DL — SIGNIFICANT CHANGE UP (ref 0.5–1.3)
GLUCOSE SERPL-MCNC: 110 MG/DL — HIGH (ref 70–99)
HCT VFR BLD CALC: 33.2 % — LOW (ref 34.5–45)
HGB BLD-MCNC: 10.9 G/DL — LOW (ref 11.5–15.5)
MAGNESIUM SERPL-MCNC: 2.4 MG/DL — SIGNIFICANT CHANGE UP (ref 1.6–2.6)
MCHC RBC-ENTMCNC: 25.2 PG — LOW (ref 27–34)
MCHC RBC-ENTMCNC: 32.8 GM/DL — SIGNIFICANT CHANGE UP (ref 32–36)
MCV RBC AUTO: 76.7 FL — LOW (ref 80–100)
NRBC # BLD: 0 /100 WBCS — SIGNIFICANT CHANGE UP (ref 0–0)
PHOSPHATE SERPL-MCNC: 2.6 MG/DL — SIGNIFICANT CHANGE UP (ref 2.5–4.5)
PLATELET # BLD AUTO: 241 K/UL — SIGNIFICANT CHANGE UP (ref 150–400)
POTASSIUM SERPL-MCNC: 3.8 MMOL/L — SIGNIFICANT CHANGE UP (ref 3.5–5.3)
POTASSIUM SERPL-SCNC: 3.8 MMOL/L — SIGNIFICANT CHANGE UP (ref 3.5–5.3)
RBC # BLD: 4.33 M/UL — SIGNIFICANT CHANGE UP (ref 3.8–5.2)
RBC # FLD: 14.7 % — HIGH (ref 10.3–14.5)
SODIUM SERPL-SCNC: 140 MMOL/L — SIGNIFICANT CHANGE UP (ref 135–145)
WBC # BLD: 5.16 K/UL — SIGNIFICANT CHANGE UP (ref 3.8–10.5)
WBC # FLD AUTO: 5.16 K/UL — SIGNIFICANT CHANGE UP (ref 3.8–10.5)

## 2021-02-13 RX ADMIN — PANTOPRAZOLE SODIUM 40 MILLIGRAM(S): 20 TABLET, DELAYED RELEASE ORAL at 12:39

## 2021-02-13 RX ADMIN — AMLODIPINE BESYLATE 5 MILLIGRAM(S): 2.5 TABLET ORAL at 05:47

## 2021-02-13 RX ADMIN — DEXTROSE MONOHYDRATE, SODIUM CHLORIDE, AND POTASSIUM CHLORIDE 120 MILLILITER(S): 50; .745; 4.5 INJECTION, SOLUTION INTRAVENOUS at 05:48

## 2021-02-13 RX ADMIN — HEPARIN SODIUM 5000 UNIT(S): 5000 INJECTION INTRAVENOUS; SUBCUTANEOUS at 18:15

## 2021-02-13 RX ADMIN — DEXTROSE MONOHYDRATE, SODIUM CHLORIDE, AND POTASSIUM CHLORIDE 120 MILLILITER(S): 50; .745; 4.5 INJECTION, SOLUTION INTRAVENOUS at 14:12

## 2021-02-13 RX ADMIN — HEPARIN SODIUM 5000 UNIT(S): 5000 INJECTION INTRAVENOUS; SUBCUTANEOUS at 05:47

## 2021-02-14 ENCOUNTER — TRANSCRIPTION ENCOUNTER (OUTPATIENT)
Age: 81
End: 2021-02-14

## 2021-02-14 RX ORDER — ACETAMINOPHEN 500 MG
650 TABLET ORAL EVERY 6 HOURS
Refills: 0 | Status: DISCONTINUED | OUTPATIENT
Start: 2021-02-14 | End: 2021-02-15

## 2021-02-14 RX ADMIN — PANTOPRAZOLE SODIUM 40 MILLIGRAM(S): 20 TABLET, DELAYED RELEASE ORAL at 12:17

## 2021-02-14 RX ADMIN — AMLODIPINE BESYLATE 5 MILLIGRAM(S): 2.5 TABLET ORAL at 04:57

## 2021-02-14 RX ADMIN — DEXTROSE MONOHYDRATE, SODIUM CHLORIDE, AND POTASSIUM CHLORIDE 120 MILLILITER(S): 50; .745; 4.5 INJECTION, SOLUTION INTRAVENOUS at 04:57

## 2021-02-14 RX ADMIN — HEPARIN SODIUM 5000 UNIT(S): 5000 INJECTION INTRAVENOUS; SUBCUTANEOUS at 18:03

## 2021-02-14 RX ADMIN — HEPARIN SODIUM 5000 UNIT(S): 5000 INJECTION INTRAVENOUS; SUBCUTANEOUS at 04:57

## 2021-02-14 NOTE — DISCHARGE NOTE PROVIDER - NSDCFUADDINST_GEN_ALL_CORE_FT
Follow up in 7-10 days. Please call the office to make an appointment. Activity as tolerated. Rest as needed. You may eat a regular diet. Do not lift anything heavier than 10 pounds. You may take motrin or advil for mild pain, Call for any fever over 101, nausea, vomiting, severe pain, no passing of gas or bowel movement. You may shower and pat dry abdomen. Leave the white steri strips in place; they will fall off in 5-7 days.   Follow up in 7-10 days. Please call the office to make an appointment. Activity as tolerated. Rest as needed. You may eat a regular diet. Do not lift anything heavier than 10 pounds. You may take motrin or advil for mild pain, Call for any fever over 101, nausea, vomiting, severe pain, no passing of gas or bowel movement. You may shower and pat dry abdomen.

## 2021-02-14 NOTE — DISCHARGE NOTE PROVIDER - CARE PROVIDER_API CALL
Zane Brush (MD)  Surgery  [address], [address] [place] [address]  Phone: [phone]  Fax: [phone]  Follow Up Time:

## 2021-02-14 NOTE — DISCHARGE NOTE PROVIDER - NSDCCPTREATMENT_GEN_ALL_CORE_FT
PRINCIPAL PROCEDURE  Procedure: Lysis of intestinal adhesions  Findings and Treatment:       SECONDARY PROCEDURE  Procedure: Exploratory laparotomy  Findings and Treatment:     Procedure: Peritoneal lavage  Findings and Treatment:     Procedure: Appendectomy  Findings and Treatment:

## 2021-02-14 NOTE — DISCHARGE NOTE PROVIDER - NSDCMRMEDTOKEN_GEN_ALL_CORE_FT
amLODIPine 5 mg oral tablet: 1 tab(s) orally once a day  metFORMIN 500 mg oral tablet: 1 tab(s) orally 2 times a day  triamterene-hydrochlorothiazide 37.5 mg-25 mg oral capsule: 1 cap(s) orally once a day   amLODIPine 5 mg oral tablet: 1 tab(s) orally once a day  metFORMIN 500 mg oral tablet: 1 tab(s) orally 2 times a day  Physical Therapy Outpatient: Diagnosis: post op abdominal surgery  triamterene-hydrochlorothiazide 37.5 mg-25 mg oral capsule: 1 cap(s) orally once a day

## 2021-02-14 NOTE — DISCHARGE NOTE PROVIDER - NSDCFUADDAPPT_GEN_ALL_CORE_FT
Please schedule an appointment to see Dr Brush next week.  Follow up with your primary care physician as soon as possible to discuss your recent hospitalization and for blood pressure.

## 2021-02-14 NOTE — DISCHARGE NOTE PROVIDER - NSDCCPCAREPLAN_GEN_ALL_CORE_FT
PRINCIPAL DISCHARGE DIAGNOSIS  Diagnosis: SBO (small bowel obstruction)  Assessment and Plan of Treatment: FENG, Ex Lap, appendectomy oin 2/9 2020.  Follow up with Dr. Brush in 1 week

## 2021-02-14 NOTE — DISCHARGE NOTE PROVIDER - HOSPITAL COURSE
HPI:  81 y/o female PMHx HTN, DM, fibroids s/p hysterectomy in 1980s, b/l breast cyst removal presents with nausea, vomiting, upper abdominal pain for 3-4 days. Last BM this AM was small. Last flatus was this morning. Last ate 2 days ago. Patient denies fever, chills, constipation, diarrhea, melena, hematochezia, dysuria, hematuria, chest pain, shortness of breath, dizziness, cough. Patient denies prior incident of SBO.  (09 Feb 2021 20:58)    During hospital stay, patient went to operating room for Ex lap, FENG, appendectomy. Patient had salum sump to suction, after surgery. Patient bowel function returned, and NGT was removed, and patient was started on a regular diet. Patient passing flatus, and regular BMs, voiding spontaneously, denies abdominal pain. Patient stable for discharge.

## 2021-02-14 NOTE — PROGRESS NOTE ADULT - ATTENDING COMMENTS
Ms. Jolleyield examined. Overall much improved. Denies abdominal pain, nausea, vomiting. She has had bowel movement, flatus and tolerating diet. Abdomen benign, staples in place. Discharge on 2/15/21

## 2021-02-15 ENCOUNTER — TRANSCRIPTION ENCOUNTER (OUTPATIENT)
Age: 81
End: 2021-02-15

## 2021-02-15 VITALS
TEMPERATURE: 98 F | RESPIRATION RATE: 18 BRPM | SYSTOLIC BLOOD PRESSURE: 183 MMHG | HEART RATE: 91 BPM | DIASTOLIC BLOOD PRESSURE: 68 MMHG | OXYGEN SATURATION: 99 %

## 2021-02-15 RX ORDER — TRIAMTERENE/HYDROCHLOROTHIAZID 75 MG-50MG
1 TABLET ORAL DAILY
Refills: 0 | Status: DISCONTINUED | OUTPATIENT
Start: 2021-02-15 | End: 2021-02-15

## 2021-02-15 RX ADMIN — HEPARIN SODIUM 5000 UNIT(S): 5000 INJECTION INTRAVENOUS; SUBCUTANEOUS at 04:53

## 2021-02-15 RX ADMIN — AMLODIPINE BESYLATE 5 MILLIGRAM(S): 2.5 TABLET ORAL at 04:53

## 2021-02-15 RX ADMIN — PANTOPRAZOLE SODIUM 40 MILLIGRAM(S): 20 TABLET, DELAYED RELEASE ORAL at 12:37

## 2021-02-15 RX ADMIN — Medication 1 TABLET(S): at 09:11

## 2021-02-15 NOTE — PROGRESS NOTE ADULT - ATTENDING COMMENTS
Patient examined.  Tolerating diet, no nausea/vomiting, passing flatus/BM's.  Abdomen soft, non tender, non distended. Staples in place, incision clean/dry/intact.  After receiving her anti-hypertensives today, Patient can be discharged with home PT. Discussed with Patient.

## 2021-02-15 NOTE — PROGRESS NOTE ADULT - REASON FOR ADMISSION
Nausea/vomiting, abdominal pain

## 2021-02-15 NOTE — DISCHARGE NOTE NURSING/CASE MANAGEMENT/SOCIAL WORK - PATIENT PORTAL LINK FT
You can access the FollowMyHealth Patient Portal offered by [facility] by registering at the following website: [email or web address]. By joining [email or web address]’s FollowMyHealth portal, you will also be able to view your health information using other applications (apps) compatible with our system.

## 2021-02-15 NOTE — PROGRESS NOTE ADULT - SUBJECTIVE AND OBJECTIVE BOX
INTERVAL HPI/OVERNIGHT EVENTS: None    STATUS POST:  Ex lap, FENG, appendectomy    POST OPERATIVE DAY #: 6    SUBJECTIVE: Patient seen and examined at bedside, patient without complaints.   Abdominal pain is well controlled. +Flatus/BM  Denies nausea and vomiting. Tolerating diet.  Denies chest pain, dyspnea, cough.      MEDICATIONS  (STANDING):  amLODIPine   Tablet 5 milliGRAM(s) Oral daily  dextrose 40% Gel 15 Gram(s) Oral once  dextrose 5%. 1000 milliLiter(s) (100 mL/Hr) IV Continuous <Continuous>  dextrose 5%. 1000 milliLiter(s) (50 mL/Hr) IV Continuous <Continuous>  dextrose 50% Injectable 25 Gram(s) IV Push once  dextrose 50% Injectable 12.5 Gram(s) IV Push once  dextrose 50% Injectable 25 Gram(s) IV Push once  glucagon  Injectable 1 milliGRAM(s) IntraMuscular once  heparin   Injectable 5000 Unit(s) SubCutaneous every 12 hours  insulin lispro (ADMELOG) corrective regimen sliding scale   SubCutaneous three times a day before meals  insulin lispro (ADMELOG) corrective regimen sliding scale   SubCutaneous at bedtime  pantoprazole  Injectable 40 milliGRAM(s) IV Push daily    MEDICATIONS  (PRN):  acetaminophen   Tablet .. 650 milliGRAM(s) Oral every 6 hours PRN Moderate Pain (4 - 6)  ondansetron Injectable 4 milliGRAM(s) IV Push every 6 hours PRN Nausea      Vital Signs Last 24 Hrs  T(C): 36.4 (15 Feb 2021 05:00), Max: 37.1 (14 Feb 2021 13:36)  T(F): 97.5 (15 Feb 2021 05:00), Max: 98.8 (14 Feb 2021 13:36)  HR: 83 (15 Feb 2021 05:00) (74 - 90)  BP: 178/75 (15 Feb 2021 05:00) (157/64 - 178/75)  RR: 17 (15 Feb 2021 05:00) (17 - 18)  SpO2: 98% (15 Feb 2021 05:00) (98% - 99%)    Physical Exam  General: NAD, WDWN.   Neuro:  Alert & responsive  HEENT: NCAT, EOMI, conjunctiva clear  CV: +S1+S2 regular rate and rhythm  Lung: clear to ausculation bilaterally, respirations nonlabored, good inspiratory effort  Abdomen: soft, Non tender, No Distension. Normal active BS, Midline staples intact, healing well  Extremities: no pedal edema or calf tenderness noted     I&O's Detail    13 Feb 2021 07:01  -  14 Feb 2021 07:00  --------------------------------------------------------  IN:    dextrose 5% + sodium chloride 0.45% w/ Additives: 1440 mL    Oral Fluid: 600 mL  Total IN: 2040 mL    OUT:  Total OUT: 0 mL    Total NET: 2040 mL      14 Feb 2021 07:01  -  15 Feb 2021 06:20  --------------------------------------------------------  IN:    Oral Fluid: 240 mL  Total IN: 240 mL    OUT:  Total OUT: 0 mL    Total NET: 240 mL          LABS:                        10.9   5.16  )-----------( 241      ( 13 Feb 2021 07:21 )             33.2     02-13    140  |  108  |  8   ----------------------------<  110<H>  3.8   |  26  |  0.64    Ca    8.9      13 Feb 2021 07:21  Phos  2.6     02-13  Mg     2.4     02-13          
Pre-operative Note    - Pre-operative Diagnosis: Closed loop SBO    - Procedure: Exploratory laparotomy, possible bowel resection, possible ostomy, all indicated procedures      Vital Signs Last 24 Hrs  T(C): 36.8 (09 Feb 2021 21:08), Max: 37.1 (09 Feb 2021 11:55)  T(F): 98.3 (09 Feb 2021 21:08), Max: 98.8 (09 Feb 2021 11:55)  HR: 66 (09 Feb 2021 23:32) (66 - 101)  BP: 173/82 (09 Feb 2021 23:27) (124/97 - 188/77)  BP(mean): --  RR: 18 (09 Feb 2021 23:32) (16 - 19)  SpO2: 100% (09 Feb 2021 23:32) (97% - 100%)    - Labs:                        15.9   12.35 )-----------( 369      ( 09 Feb 2021 14:14 )             48.0     02-09    140  |  98  |  44<H>  ----------------------------<  175<H>  3.7   |  34<H>  |  2.39<H>    Ca    11.3<H>      09 Feb 2021 14:14    TPro  9.5<H>  /  Alb  4.7  /  TBili  0.6  /  DBili  x   /  AST  18  /  ALT  24  /  AlkPhos  141<H>  02-09    PT/INR - ( 09 Feb 2021 20:58 )   PT: 13.1 sec;   INR: 1.14 ratio      PTT - ( 09 Feb 2021 20:58 )  PTT:29.5 sec    - CXR: completed    - EKG: completed      81 y/o female PMHx HTN, DM, fibroids s/p hysterectomy in 1980s, b/l breast cyst removal presents with nausea, vomiting, upper abdominal pain for 3-4 days. Admitted with closed loop SBO.    - Orders:  > NPO, NGT to LWS, 3 boluses given, thomson  > Zosyn  > Pre-op labs: CBC, BMP, coags, type & screen  > 2uPRBC on hold for OR  > DVT ppx, GI ppx    - Permits:  > Consent in chart.  > Case scheduled with OR.  > Covid negative.   > ICU notified. Discussed patient with Dr. Dill.  > Discussed with Dr Brush    
POST-OP NOTE:  S/P Ex lap, FENG, appendectomy POD#1  Pt seen and examined at bedside. Pain is well controlled on pain medication. Pt denies complaints. No acute events overnight. Pt tolerating NGT. Pt denies nausea and vomiting. No BM/flatus yet. Using incentive spirometer. Pt denies chest pain, SOB, dizziness, fever, chills.     Vital Signs Last 24 Hrs  T(C): 36.8 (10 Feb 2021 03:30), Max: 37.1 (2021 11:55)  T(F): 98.2 (10 Feb 2021 03:30), Max: 98.8 (2021 11:55)  HR: 77 (10 Feb 2021 03:30) (66 - 101)  BP: 153/72 (10 Feb 2021 03:30) (124/97 - 188/77)  BP(mean): --  RR: 17 (10 Feb 2021 03:30) (15 - 19)  SpO2: 96% (10 Feb 2021 03:30) (96% - 100%)      PHYSICAL EXAM:    GENERAL: NAD  HEENT: NGT to LWS  CHEST/LUNG: Clear to ausculation, bilaterally   HEART: RRR S1S2  ABDOMEN: Aquacel clean/dry/intact. non distended, hypoactive BS, soft, appropriate incisional tenderness  : Hernández indwelling with clear yellow urine output  EXTREMITIES:  calf soft, non tender b/l    I&O's Detail    2021 07:01  -  10 Feb 2021 07:00  --------------------------------------------------------  IN:    Lactated Ringers: 50 mL  Total IN: 50 mL    OUT:    Indwelling Catheter - Urethral (mL): 250 mL    Nasogastric/Oral tube (mL): 75 mL  Total OUT: 325 mL    Total NET: -275 mL      LABS:                        12.4   6.05  )-----------( 298      ( 10 Feb 2021 06:45 )             37.7     02-09    146<H>  |  112<H>  |  48<H>  ----------------------------<  134<H>  3.2<L>   |  26  |  1.62<H>    Ca    8.3<L>      2021 23:32  Phos  4.4     02-  Mg     2.4     -    TPro  9.5<H>  /  Alb  4.7  /  TBili  0.6  /  DBili  x   /  AST  18  /  ALT  24  /  AlkPhos  141<H>  -    PT/INR - ( 2021 20:58 )   PT: 13.1 sec;   INR: 1.14 ratio         PTT - ( 2021 20:58 )  PTT:29.5 sec  Urinalysis Basic - ( 2021 17:33 )    Color: Yellow / Appearance: Clear / S.020 / pH: x  Gluc: x / Ketone: Negative  / Bili: Small / Urobili: Negative mg/dL   Blood: x / Protein: 100 mg/dL / Nitrite: Negative   Leuk Esterase: Trace / RBC: 0-2 /HPF / WBC 3-5   Sq Epi: x / Non Sq Epi: Occasional / Bacteria: Moderate        Assessment: 81 y/o female PMHx HTN, DM, fibroids s/p hysterectomy in , b/l breast cyst removal admitted with closed loop SBO S/P Ex lap, FENG, appendectomy POD#1    Plan:  -NPO, NGT to LWS, IVF. Await bowel function.   -pain management prn  -continue DVT prophylaxis, OOB, Ambulating  -continue incentive spirometer  -continue local wound care with aquacel dressing   -antibiotics: Zosyn x 2 doses  -f/u labs  -Discussed with Dr. Brush. Called daughter to update her as well. 
Surgery NP note  Patient seen and examined bedside resting comfortably.  No complaints offered. Abdominal pain is well controlled.  Denies nausea and vomiting. NPO.  No flatus or BM.     T(F): 98.4 (02-12-21 @ 05:09), Max: 99.6 (02-11-21 @ 17:24)  HR: 65 (02-12-21 @ 05:09) (65 - 70)  BP: 174/84 (02-12-21 @ 05:09) (150/85 - 188/71)  RR: 18 (02-12-21 @ 05:09) (18 - 19)  SpO2: 98% (02-12-21 @ 05:09) (96% - 98%)  Wt(kg): --  CAPILLARY BLOOD GLUCOSE      POCT Blood Glucose.: 106 mg/dL (12 Feb 2021 05:38)  POCT Blood Glucose.: 106 mg/dL (11 Feb 2021 21:27)  POCT Blood Glucose.: 107 mg/dL (11 Feb 2021 17:00)  POCT Blood Glucose.: 92 mg/dL (11 Feb 2021 11:56)      PHYSICAL EXAM:  General: NAD, WDWN.   Neuro:  Alert & responsive  HEENT: NCAT, EOMI, conjunctiva clear  CV: +S1+S2 regular rate and rhythm  Lung: clear to ausculation bilaterally, respirations nonlabored, good inspiratory effort  Abdomen: soft, Non tender, No Distension. Normal active BS. Aquacel dressing in place with s/s blood stain  Extremities: no pedal edema or calf tenderness noted     LABS:                        Pending      I&O's Detail    10 Feb 2021 07:01  -  11 Feb 2021 07:00  --------------------------------------------------------  IN:    dextrose 5% + sodium chloride 0.45% w/ Additives: 1300 mL    IV PiggyBack: 100 mL  Total IN: 1400 mL    OUT:    Indwelling Catheter - Urethral (mL): 220 mL    Nasogastric/Oral tube (mL): 300 mL    Voided (mL): 1050 mL  Total OUT: 1570 mL    Total NET: -170 mL      11 Feb 2021 07:01  -  12 Feb 2021 06:09  --------------------------------------------------------  IN:    dextrose 5% + sodium chloride 0.45% w/ Additives: 1440 mL    IV PiggyBack: 250 mL  Total IN: 1690 mL    OUT:    Nasogastric/Oral tube (mL): 150 mL  Total OUT: 150 mL    Total NET: 1540 mL      
Surgery NP note  Patient seen and examined bedside resting comfortably.  No complaints offered. Abdominal pain is well controlled.  Denies nausea and vomiting. Tolerating clear liquid diet.  Normal flatus/BM last night    T(F): 99 (02-13-21 @ 05:07), Max: 99.6 (02-13-21 @ 00:20)  HR: 74 (02-13-21 @ 05:07) (71 - 85)  BP: 155/75 (02-13-21 @ 05:07) (155/75 - 186/74)  RR: 17 (02-13-21 @ 05:07) (16 - 19)  SpO2: 99% (02-13-21 @ 05:07) (99% - 100%)  Wt(kg): --  CAPILLARY BLOOD GLUCOSE      POCT Blood Glucose.: 100 mg/dL (12 Feb 2021 22:18)  POCT Blood Glucose.: 105 mg/dL (12 Feb 2021 16:17)  POCT Blood Glucose.: 99 mg/dL (12 Feb 2021 11:51)      PHYSICAL EXAM:  General: NAD, WDWN.   Neuro:  Alert & responsive  HEENT: NCAT, EOMI, conjunctiva clear  CV: +S1+S2 regular rate and rhythm  Lung: clear to ausculation bilaterally, respirations nonlabored, good inspiratory effort  Abdomen: soft, Non tender, No Distension. Normal active BS Aquacel dressing in place with s/s blood stain  Extremities: no pedal edema or calf tenderness noted     LABS:                        11.0   6.20  )-----------( 234      ( 12 Feb 2021 07:38 )             35.1     02-12    145  |  111<H>  |  10  ----------------------------<  100<H>  4.2   |  29  |  0.65    Ca    8.5      12 Feb 2021 07:38  Phos  1.7     02-12  Mg     2.1     02-12          I&O's Detail    11 Feb 2021 07:01  -  12 Feb 2021 07:00  --------------------------------------------------------  IN:    dextrose 5% + sodium chloride 0.45% w/ Additives: 2880 mL    IV PiggyBack: 250 mL  Total IN: 3130 mL    OUT:    Nasogastric/Oral tube (mL): 150 mL  Total OUT: 150 mL    Total NET: 2980 mL      12 Feb 2021 07:01  -  13 Feb 2021 06:02  --------------------------------------------------------  IN:    dextrose 5% + sodium chloride 0.45% w/ Additives: 1440 mL    IV PiggyBack: 250 mL    Oral Fluid: 220 mL  Total IN: 1910 mL    OUT:  Total OUT: 0 mL    Total NET: 1910 mL        
INTERVAL HPI/OVERNIGHT EVENTS: None    STATUS POST:  Ex lap, FENG, appendectomy    POST OPERATIVE DAY #: 5    SUBJECTIVE: Patient seen and examined at bedside, patient without complaints.   Abdominal pain is well controlled. +Flatus/BM  Denies nausea and vomiting. Tolerating diet.  Denies chest pain, dyspnea, cough.      MEDICATIONS  (STANDING):  amLODIPine   Tablet 5 milliGRAM(s) Oral daily  dextrose 40% Gel 15 Gram(s) Oral once  dextrose 5% + sodium chloride 0.45% with potassium chloride 20 mEq/L 1000 milliLiter(s) (120 mL/Hr) IV Continuous <Continuous>  dextrose 5%. 1000 milliLiter(s) (100 mL/Hr) IV Continuous <Continuous>  dextrose 5%. 1000 milliLiter(s) (50 mL/Hr) IV Continuous <Continuous>  dextrose 50% Injectable 25 Gram(s) IV Push once  dextrose 50% Injectable 12.5 Gram(s) IV Push once  dextrose 50% Injectable 25 Gram(s) IV Push once  glucagon  Injectable 1 milliGRAM(s) IntraMuscular once  heparin   Injectable 5000 Unit(s) SubCutaneous every 12 hours  insulin lispro (ADMELOG) corrective regimen sliding scale   SubCutaneous three times a day before meals  insulin lispro (ADMELOG) corrective regimen sliding scale   SubCutaneous at bedtime  pantoprazole  Injectable 40 milliGRAM(s) IV Push daily    MEDICATIONS  (PRN):  morphine  - Injectable 2 milliGRAM(s) IV Push every 4 hours PRN Severe Pain (7 - 10)  ondansetron Injectable 4 milliGRAM(s) IV Push every 6 hours PRN Nausea      Vital Signs Last 24 Hrs  T(C): 37.1 (14 Feb 2021 05:00), Max: 37.3 (13 Feb 2021 17:18)  T(F): 98.7 (14 Feb 2021 05:00), Max: 99.2 (13 Feb 2021 17:18)  HR: 93 (14 Feb 2021 05:00) (83 - 93)  BP: 154/79 (14 Feb 2021 05:00) (147/81 - 173/72)  RR: 17 (14 Feb 2021 05:00) (16 - 18)  SpO2: 97% (14 Feb 2021 05:00) (97% - 99%)    Physical Exam  General: NAD, WDWN.   Neuro:  Alert & responsive  HEENT: NCAT, EOMI, conjunctiva clear  CV: +S1+S2 regular rate and rhythm  Lung: clear to ausculation bilaterally, respirations nonlabored, good inspiratory effort  Abdomen: soft, Non tender, No Distension. Normal active BS, Midline staples intact, healing well  Extremities: no pedal edema or calf tenderness noted       LABS:                        10.9   5.16  )-----------( 241      ( 13 Feb 2021 07:21 )             33.2     02-13    140  |  108  |  8   ----------------------------<  110<H>  3.8   |  26  |  0.64    Ca    8.9      13 Feb 2021 07:21  Phos  2.6     02-13  Mg     2.4     02-13          
Surgery NP note  Patient seen and examined bedside resting comfortably.  Denies nausea and vomiting. NGT in place  Afebrile overnight, no acute events  No Flatus    T(F): 98 (02-11-21 @ 05:01), Max: 99.2 (02-10-21 @ 23:19)  HR: 81 (02-11-21 @ 05:01) (72 - 81)  BP: 179/85 (02-11-21 @ 05:01) (146/70 - 179/85)  RR: 18 (02-11-21 @ 05:01) (16 - 18)  SpO2: 96% (02-11-21 @ 05:01) (96% - 98%)  Wt(kg): --  CAPILLARY BLOOD GLUCOSE      POCT Blood Glucose.: 111 mg/dL (11 Feb 2021 05:53)  POCT Blood Glucose.: 113 mg/dL (10 Feb 2021 21:36)  POCT Blood Glucose.: 99 mg/dL (10 Feb 2021 17:13)  POCT Blood Glucose.: 106 mg/dL (10 Feb 2021 11:59)  POCT Blood Glucose.: 163 mg/dL (10 Feb 2021 08:02)      PHYSICAL EXAM:  General: NAD, WDWN.   Neuro:  Alert & responsive  HEENT: NCAT, EOMI, conjunctiva clear,  CV: +S1+S2 regular rate and rhythm  Lung: clear to ausculation bilaterally, respirations nonlabored, good inspiratory effort  Abdomen: soft, incisional tenderness, Aquacel in place. No Distension. hypoactive BS.   Extremities: no pedal edema or calf tenderness noted     LABS:             Pending    I&O's Detail    09 Feb 2021 07:01  -  10 Feb 2021 07:00  --------------------------------------------------------  IN:    dextrose 5% + sodium chloride 0.45% w/ Additives: 1440 mL    IV PiggyBack: 100 mL    Lactated Ringers: 50 mL  Total IN: 1590 mL    OUT:    Indwelling Catheter - Urethral (mL): 950 mL    Nasogastric/Oral tube (mL): 150 mL  Total OUT: 1100 mL    Total NET: 490 mL      10 Feb 2021 07:01  -  11 Feb 2021 06:44  --------------------------------------------------------  IN:    dextrose 5% + sodium chloride 0.45% w/ Additives: 1300 mL    IV PiggyBack: 100 mL  Total IN: 1400 mL    OUT:    Indwelling Catheter - Urethral (mL): 220 mL    Nasogastric/Oral tube (mL): 300 mL    Voided (mL): 1050 mL  Total OUT: 1570 mL    Total NET: -170 mL

## 2021-02-15 NOTE — PROGRESS NOTE ADULT - ASSESSMENT
Assessment: 79 y/o female PMHx HTN, DM, fibroids s/p hysterectomy in 1980s, b/l breast cyst removal admitted with closed loop SBO S/P Ex lap, FENG, appendectomy POD#3    Plan:  -NPO, IVF. Await bowel function.   -pain management prn, decrease narcotics  -continue DVT prophylaxis, OOB, Ambulate with PT  -continue incentive spirometer  -continue local wound care with Aquacel dressing   -f/u labs
Assessment: 79 y/o female PMHx HTN, DM, fibroids s/p hysterectomy in 1980s, b/l breast cyst removal admitted with closed loop SBO S/P Ex lap, FENG, appendectomy POD#4    Plan:  -full liquid diet today  -pain management prn, decrease narcotics  -continue DVT prophylaxis, OOB, Ambulate with PT  -continue incentive spirometer  -continue local wound care with Aquacel dressing   -f/u labs
Assessment: 81 y/o female PMHx HTN, DM, fibroids s/p hysterectomy in 1980s, b/l breast cyst removal admitted with closed loop SBO S/P Ex lap, FENG, appendectomy POD#5    Plan:  - Continue regular diet  - pain management prn, decrease narcotics  - continue DVT prophylaxis, OOB, Ambulate with PT  - continue incentive spirometer  - continue local wound care  
Assessment: 79 y/o female PMHx HTN, DM, fibroids s/p hysterectomy in 1980s, b/l breast cyst removal admitted with closed loop SBO S/P Ex lap, FENG, appendectomy POD#2    Plan:  -NPO, NGT to LWS, IVF. Await bowel function.   -pain management prn, decrease narcotics  -continue DVT prophylaxis, OOB, Ambulate with PT  -continue incentive spirometer  -continue local wound care with Aquacel dressing   -f/u labs
Assessment: 81 y/o female PMHx HTN, DM, fibroids s/p hysterectomy in 1980s, b/l breast cyst removal admitted with closed loop SBO S/P Ex lap, FENG, appendectomy POD#6    Plan:  - DC home today  - Continue regular diet  - pain management prn  - continue DVT prophylaxis, OOB, Ambulate with PT  - continue incentive spirometer  - continue local wound care

## 2021-02-17 PROBLEM — I10 ESSENTIAL (PRIMARY) HYPERTENSION: Chronic | Status: ACTIVE | Noted: 2021-02-09

## 2021-02-17 PROBLEM — E11.9 TYPE 2 DIABETES MELLITUS WITHOUT COMPLICATIONS: Chronic | Status: ACTIVE | Noted: 2021-02-09

## 2021-02-23 ENCOUNTER — APPOINTMENT (OUTPATIENT)
Dept: SURGERY | Facility: CLINIC | Age: 81
End: 2021-02-23
Payer: MEDICARE

## 2021-02-23 VITALS
TEMPERATURE: 97.5 F | BODY MASS INDEX: 25.4 KG/M2 | OXYGEN SATURATION: 97 % | HEART RATE: 99 BPM | HEIGHT: 62 IN | DIASTOLIC BLOOD PRESSURE: 76 MMHG | SYSTOLIC BLOOD PRESSURE: 92 MMHG | WEIGHT: 138 LBS

## 2021-02-23 PROCEDURE — 99024 POSTOP FOLLOW-UP VISIT: CPT

## 2021-02-23 NOTE — HISTORY OF PRESENT ILLNESS
[de-identified] : Patient is a 80 year old woman who presents after undergoing exploratory laparotomy with appendectomy for closed-loop small bowel obstruction on 2/9. Since the surgery, patient states that she feels well, is tolerating diet well, having normal urinary/bowel function, denies fever/chills, no N/V. No further complaints at this time.\par \par Abdominal exam reveals well-healing midline incision without incisional hernia, no erythema/drainage, staples removed, non-TTP in all quadrants, no rebound/guarding.\par \par Pathology shows appendix without significant histologic changes which was reviewed with the patient.\par \par Patient is doing well, advised to avoid heavy lifting for total of 4 weeks. Patient to RTC PRN. All questions from the patient and her daughter were answered.

## 2021-04-18 ENCOUNTER — TRANSCRIPTION ENCOUNTER (OUTPATIENT)
Age: 81
End: 2021-04-18

## 2021-04-19 ENCOUNTER — INPATIENT (INPATIENT)
Facility: HOSPITAL | Age: 81
LOS: 5 days | Discharge: HOME | End: 2021-04-25
Attending: STUDENT IN AN ORGANIZED HEALTH CARE EDUCATION/TRAINING PROGRAM | Admitting: STUDENT IN AN ORGANIZED HEALTH CARE EDUCATION/TRAINING PROGRAM
Payer: MEDICARE

## 2021-04-19 VITALS
HEART RATE: 97 BPM | OXYGEN SATURATION: 99 % | RESPIRATION RATE: 18 BRPM | TEMPERATURE: 98 F | DIASTOLIC BLOOD PRESSURE: 77 MMHG | SYSTOLIC BLOOD PRESSURE: 166 MMHG | WEIGHT: 139.99 LBS | HEIGHT: 62 IN

## 2021-04-19 DIAGNOSIS — I10 ESSENTIAL (PRIMARY) HYPERTENSION: ICD-10-CM

## 2021-04-19 DIAGNOSIS — N60.09 SOLITARY CYST OF UNSPECIFIED BREAST: Chronic | ICD-10-CM

## 2021-04-19 DIAGNOSIS — Z98.890 OTHER SPECIFIED POSTPROCEDURAL STATES: Chronic | ICD-10-CM

## 2021-04-19 DIAGNOSIS — Z29.9 ENCOUNTER FOR PROPHYLACTIC MEASURES, UNSPECIFIED: ICD-10-CM

## 2021-04-19 DIAGNOSIS — E11.9 TYPE 2 DIABETES MELLITUS WITHOUT COMPLICATIONS: ICD-10-CM

## 2021-04-19 DIAGNOSIS — Z90.710 ACQUIRED ABSENCE OF BOTH CERVIX AND UTERUS: Chronic | ICD-10-CM

## 2021-04-19 DIAGNOSIS — K56.609 UNSPECIFIED INTESTINAL OBSTRUCTION, UNSPECIFIED AS TO PARTIAL VERSUS COMPLETE OBSTRUCTION: ICD-10-CM

## 2021-04-19 LAB
ALBUMIN SERPL ELPH-MCNC: 4.2 G/DL — SIGNIFICANT CHANGE UP (ref 3.3–5)
ALP SERPL-CCNC: 132 U/L — HIGH (ref 40–120)
ALT FLD-CCNC: 20 U/L — SIGNIFICANT CHANGE UP (ref 12–78)
ANION GAP SERPL CALC-SCNC: 6 MMOL/L — SIGNIFICANT CHANGE UP (ref 5–17)
APPEARANCE UR: CLEAR — SIGNIFICANT CHANGE UP
APTT BLD: 32.5 SEC — SIGNIFICANT CHANGE UP (ref 27.5–35.5)
AST SERPL-CCNC: 17 U/L — SIGNIFICANT CHANGE UP (ref 15–37)
BACTERIA # UR AUTO: ABNORMAL
BASOPHILS # BLD AUTO: 0.02 K/UL — SIGNIFICANT CHANGE UP (ref 0–0.2)
BASOPHILS NFR BLD AUTO: 0.2 % — SIGNIFICANT CHANGE UP (ref 0–2)
BILIRUB SERPL-MCNC: 0.7 MG/DL — SIGNIFICANT CHANGE UP (ref 0.2–1.2)
BILIRUB UR-MCNC: NEGATIVE — SIGNIFICANT CHANGE UP
BLD GP AB SCN SERPL QL: SIGNIFICANT CHANGE UP
BUN SERPL-MCNC: 22 MG/DL — SIGNIFICANT CHANGE UP (ref 7–23)
CALCIUM SERPL-MCNC: 10.7 MG/DL — HIGH (ref 8.5–10.1)
CHLORIDE SERPL-SCNC: 101 MMOL/L — SIGNIFICANT CHANGE UP (ref 96–108)
CO2 SERPL-SCNC: 35 MMOL/L — HIGH (ref 22–31)
COLOR SPEC: YELLOW — SIGNIFICANT CHANGE UP
CREAT SERPL-MCNC: 1.11 MG/DL — SIGNIFICANT CHANGE UP (ref 0.5–1.3)
DIFF PNL FLD: ABNORMAL
EOSINOPHIL # BLD AUTO: 0 K/UL — SIGNIFICANT CHANGE UP (ref 0–0.5)
EOSINOPHIL NFR BLD AUTO: 0 % — SIGNIFICANT CHANGE UP (ref 0–6)
EPI CELLS # UR: SIGNIFICANT CHANGE UP
FLUAV AG NPH QL: SIGNIFICANT CHANGE UP
FLUBV AG NPH QL: SIGNIFICANT CHANGE UP
GLUCOSE BLDC GLUCOMTR-MCNC: 118 MG/DL — HIGH (ref 70–99)
GLUCOSE BLDC GLUCOMTR-MCNC: 133 MG/DL — HIGH (ref 70–99)
GLUCOSE SERPL-MCNC: 173 MG/DL — HIGH (ref 70–99)
GLUCOSE UR QL: 50 MG/DL
HCT VFR BLD CALC: 39.1 % — SIGNIFICANT CHANGE UP (ref 34.5–45)
HGB BLD-MCNC: 13.1 G/DL — SIGNIFICANT CHANGE UP (ref 11.5–15.5)
IMM GRANULOCYTES NFR BLD AUTO: 0.4 % — SIGNIFICANT CHANGE UP (ref 0–1.5)
INR BLD: 1.12 RATIO — SIGNIFICANT CHANGE UP (ref 0.88–1.16)
KETONES UR-MCNC: ABNORMAL
LACTATE SERPL-SCNC: 1.9 MMOL/L — SIGNIFICANT CHANGE UP (ref 0.7–2)
LEUKOCYTE ESTERASE UR-ACNC: NEGATIVE — SIGNIFICANT CHANGE UP
LYMPHOCYTES # BLD AUTO: 0.59 K/UL — LOW (ref 1–3.3)
LYMPHOCYTES # BLD AUTO: 7.2 % — LOW (ref 13–44)
MCHC RBC-ENTMCNC: 25.3 PG — LOW (ref 27–34)
MCHC RBC-ENTMCNC: 33.5 GM/DL — SIGNIFICANT CHANGE UP (ref 32–36)
MCV RBC AUTO: 75.6 FL — LOW (ref 80–100)
MONOCYTES # BLD AUTO: 0.23 K/UL — SIGNIFICANT CHANGE UP (ref 0–0.9)
MONOCYTES NFR BLD AUTO: 2.8 % — SIGNIFICANT CHANGE UP (ref 2–14)
NEUTROPHILS # BLD AUTO: 7.28 K/UL — SIGNIFICANT CHANGE UP (ref 1.8–7.4)
NEUTROPHILS NFR BLD AUTO: 89.4 % — HIGH (ref 43–77)
NITRITE UR-MCNC: NEGATIVE — SIGNIFICANT CHANGE UP
NRBC # BLD: 0 /100 WBCS — SIGNIFICANT CHANGE UP (ref 0–0)
PH UR: 6.5 — SIGNIFICANT CHANGE UP (ref 5–8)
PLATELET # BLD AUTO: 339 K/UL — SIGNIFICANT CHANGE UP (ref 150–400)
POTASSIUM SERPL-MCNC: 3.5 MMOL/L — SIGNIFICANT CHANGE UP (ref 3.5–5.3)
POTASSIUM SERPL-SCNC: 3.5 MMOL/L — SIGNIFICANT CHANGE UP (ref 3.5–5.3)
PROT SERPL-MCNC: 8.7 GM/DL — HIGH (ref 6–8.3)
PROT UR-MCNC: 500 MG/DL
PROTHROM AB SERPL-ACNC: 12.9 SEC — SIGNIFICANT CHANGE UP (ref 10.6–13.6)
RBC # BLD: 5.17 M/UL — SIGNIFICANT CHANGE UP (ref 3.8–5.2)
RBC # FLD: 14.7 % — HIGH (ref 10.3–14.5)
RBC CASTS # UR COMP ASSIST: ABNORMAL /HPF (ref 0–4)
SARS-COV-2 RNA SPEC QL NAA+PROBE: SIGNIFICANT CHANGE UP
SODIUM SERPL-SCNC: 142 MMOL/L — SIGNIFICANT CHANGE UP (ref 135–145)
SP GR SPEC: 1.01 — SIGNIFICANT CHANGE UP (ref 1.01–1.02)
UROBILINOGEN FLD QL: NEGATIVE MG/DL — SIGNIFICANT CHANGE UP
WBC # BLD: 8.15 K/UL — SIGNIFICANT CHANGE UP (ref 3.8–10.5)
WBC # FLD AUTO: 8.15 K/UL — SIGNIFICANT CHANGE UP (ref 3.8–10.5)
WBC UR QL: SIGNIFICANT CHANGE UP

## 2021-04-19 PROCEDURE — 44050 REDUCE BOWEL OBSTRUCTION: CPT | Mod: 78

## 2021-04-19 PROCEDURE — 74018 RADEX ABDOMEN 1 VIEW: CPT | Mod: 26

## 2021-04-19 PROCEDURE — 93010 ELECTROCARDIOGRAM REPORT: CPT

## 2021-04-19 PROCEDURE — 99285 EMERGENCY DEPT VISIT HI MDM: CPT

## 2021-04-19 PROCEDURE — 71045 X-RAY EXAM CHEST 1 VIEW: CPT | Mod: 26

## 2021-04-19 PROCEDURE — 44050 REDUCE BOWEL OBSTRUCTION: CPT | Mod: AS

## 2021-04-19 PROCEDURE — 74177 CT ABD & PELVIS W/CONTRAST: CPT | Mod: 26,MA

## 2021-04-19 PROCEDURE — 99221 1ST HOSP IP/OBS SF/LOW 40: CPT | Mod: 57,24

## 2021-04-19 RX ORDER — GLUCAGON INJECTION, SOLUTION 0.5 MG/.1ML
1 INJECTION, SOLUTION SUBCUTANEOUS ONCE
Refills: 0 | Status: DISCONTINUED | OUTPATIENT
Start: 2021-04-19 | End: 2021-04-25

## 2021-04-19 RX ORDER — SODIUM CHLORIDE 9 MG/ML
1000 INJECTION, SOLUTION INTRAVENOUS
Refills: 0 | Status: DISCONTINUED | OUTPATIENT
Start: 2021-04-19 | End: 2021-04-19

## 2021-04-19 RX ORDER — ONDANSETRON 8 MG/1
4 TABLET, FILM COATED ORAL EVERY 6 HOURS
Refills: 0 | Status: DISCONTINUED | OUTPATIENT
Start: 2021-04-19 | End: 2021-04-19

## 2021-04-19 RX ORDER — HEPARIN SODIUM 5000 [USP'U]/ML
5000 INJECTION INTRAVENOUS; SUBCUTANEOUS EVERY 12 HOURS
Refills: 0 | Status: DISCONTINUED | OUTPATIENT
Start: 2021-04-19 | End: 2021-04-19

## 2021-04-19 RX ORDER — MORPHINE SULFATE 50 MG/1
4 CAPSULE, EXTENDED RELEASE ORAL ONCE
Refills: 0 | Status: DISCONTINUED | OUTPATIENT
Start: 2021-04-19 | End: 2021-04-19

## 2021-04-19 RX ORDER — HEPARIN SODIUM 5000 [USP'U]/ML
5000 INJECTION INTRAVENOUS; SUBCUTANEOUS EVERY 12 HOURS
Refills: 0 | Status: DISCONTINUED | OUTPATIENT
Start: 2021-04-19 | End: 2021-04-25

## 2021-04-19 RX ORDER — DEXTROSE 50 % IN WATER 50 %
12.5 SYRINGE (ML) INTRAVENOUS ONCE
Refills: 0 | Status: DISCONTINUED | OUTPATIENT
Start: 2021-04-19 | End: 2021-04-25

## 2021-04-19 RX ORDER — ONDANSETRON 8 MG/1
4 TABLET, FILM COATED ORAL EVERY 6 HOURS
Refills: 0 | Status: DISCONTINUED | OUTPATIENT
Start: 2021-04-19 | End: 2021-04-25

## 2021-04-19 RX ORDER — ONDANSETRON 8 MG/1
4 TABLET, FILM COATED ORAL ONCE
Refills: 0 | Status: DISCONTINUED | OUTPATIENT
Start: 2021-04-19 | End: 2021-04-19

## 2021-04-19 RX ORDER — PIPERACILLIN AND TAZOBACTAM 4; .5 G/20ML; G/20ML
3.38 INJECTION, POWDER, LYOPHILIZED, FOR SOLUTION INTRAVENOUS ONCE
Refills: 0 | Status: COMPLETED | OUTPATIENT
Start: 2021-04-19 | End: 2021-04-19

## 2021-04-19 RX ORDER — SODIUM CHLORIDE 9 MG/ML
1000 INJECTION, SOLUTION INTRAVENOUS
Refills: 0 | Status: DISCONTINUED | OUTPATIENT
Start: 2021-04-19 | End: 2021-04-25

## 2021-04-19 RX ORDER — CYCLOBENZAPRINE HYDROCHLORIDE 10 MG/1
5 TABLET, FILM COATED ORAL AT BEDTIME
Refills: 0 | Status: DISCONTINUED | OUTPATIENT
Start: 2021-04-19 | End: 2021-04-20

## 2021-04-19 RX ORDER — AMLODIPINE BESYLATE 2.5 MG/1
1 TABLET ORAL
Qty: 0 | Refills: 0 | DISCHARGE

## 2021-04-19 RX ORDER — HYDROMORPHONE HYDROCHLORIDE 2 MG/ML
0.5 INJECTION INTRAMUSCULAR; INTRAVENOUS; SUBCUTANEOUS
Refills: 0 | Status: DISCONTINUED | OUTPATIENT
Start: 2021-04-19 | End: 2021-04-19

## 2021-04-19 RX ORDER — DEXTROSE 50 % IN WATER 50 %
25 SYRINGE (ML) INTRAVENOUS ONCE
Refills: 0 | Status: DISCONTINUED | OUTPATIENT
Start: 2021-04-19 | End: 2021-04-25

## 2021-04-19 RX ORDER — INSULIN LISPRO 100/ML
VIAL (ML) SUBCUTANEOUS
Refills: 0 | Status: DISCONTINUED | OUTPATIENT
Start: 2021-04-19 | End: 2021-04-19

## 2021-04-19 RX ORDER — PANTOPRAZOLE SODIUM 20 MG/1
40 TABLET, DELAYED RELEASE ORAL DAILY
Refills: 0 | Status: DISCONTINUED | OUTPATIENT
Start: 2021-04-19 | End: 2021-04-24

## 2021-04-19 RX ORDER — DEXTROSE 50 % IN WATER 50 %
15 SYRINGE (ML) INTRAVENOUS ONCE
Refills: 0 | Status: DISCONTINUED | OUTPATIENT
Start: 2021-04-19 | End: 2021-04-25

## 2021-04-19 RX ORDER — ACETAMINOPHEN 500 MG
1000 TABLET ORAL ONCE
Refills: 0 | Status: COMPLETED | OUTPATIENT
Start: 2021-04-19 | End: 2021-04-21

## 2021-04-19 RX ORDER — METFORMIN HYDROCHLORIDE 850 MG/1
1 TABLET ORAL
Qty: 0 | Refills: 0 | DISCHARGE

## 2021-04-19 RX ORDER — MORPHINE SULFATE 50 MG/1
2 CAPSULE, EXTENDED RELEASE ORAL EVERY 4 HOURS
Refills: 0 | Status: DISCONTINUED | OUTPATIENT
Start: 2021-04-19 | End: 2021-04-23

## 2021-04-19 RX ORDER — SODIUM CHLORIDE 9 MG/ML
1000 INJECTION, SOLUTION INTRAVENOUS
Refills: 0 | Status: DISCONTINUED | OUTPATIENT
Start: 2021-04-19 | End: 2021-04-20

## 2021-04-19 RX ORDER — INSULIN LISPRO 100/ML
VIAL (ML) SUBCUTANEOUS EVERY 6 HOURS
Refills: 0 | Status: DISCONTINUED | OUTPATIENT
Start: 2021-04-19 | End: 2021-04-25

## 2021-04-19 RX ORDER — METOPROLOL TARTRATE 50 MG
2.5 TABLET ORAL EVERY 6 HOURS
Refills: 0 | Status: DISCONTINUED | OUTPATIENT
Start: 2021-04-19 | End: 2021-04-21

## 2021-04-19 RX ORDER — ACETAMINOPHEN 500 MG
950 TABLET ORAL ONCE
Refills: 0 | Status: DISCONTINUED | OUTPATIENT
Start: 2021-04-19 | End: 2021-04-19

## 2021-04-19 RX ORDER — MORPHINE SULFATE 50 MG/1
2 CAPSULE, EXTENDED RELEASE ORAL EVERY 4 HOURS
Refills: 0 | Status: DISCONTINUED | OUTPATIENT
Start: 2021-04-19 | End: 2021-04-19

## 2021-04-19 RX ORDER — ONDANSETRON 8 MG/1
4 TABLET, FILM COATED ORAL ONCE
Refills: 0 | Status: COMPLETED | OUTPATIENT
Start: 2021-04-19 | End: 2021-04-19

## 2021-04-19 RX ORDER — SODIUM CHLORIDE 9 MG/ML
1000 INJECTION INTRAMUSCULAR; INTRAVENOUS; SUBCUTANEOUS ONCE
Refills: 0 | Status: COMPLETED | OUTPATIENT
Start: 2021-04-19 | End: 2021-04-19

## 2021-04-19 RX ORDER — HYDROMORPHONE HYDROCHLORIDE 2 MG/ML
0.25 INJECTION INTRAMUSCULAR; INTRAVENOUS; SUBCUTANEOUS
Refills: 0 | Status: DISCONTINUED | OUTPATIENT
Start: 2021-04-19 | End: 2021-04-19

## 2021-04-19 RX ORDER — TRIAMTERENE/HYDROCHLOROTHIAZID 75 MG-50MG
1 TABLET ORAL
Qty: 0 | Refills: 0 | DISCHARGE

## 2021-04-19 RX ORDER — METOPROLOL TARTRATE 50 MG
5 TABLET ORAL ONCE
Refills: 0 | Status: COMPLETED | OUTPATIENT
Start: 2021-04-19 | End: 2021-04-19

## 2021-04-19 RX ADMIN — MORPHINE SULFATE 4 MILLIGRAM(S): 50 CAPSULE, EXTENDED RELEASE ORAL at 07:10

## 2021-04-19 RX ADMIN — SODIUM CHLORIDE 100 MILLILITER(S): 9 INJECTION, SOLUTION INTRAVENOUS at 17:13

## 2021-04-19 RX ADMIN — Medication 5 MILLIGRAM(S): at 09:41

## 2021-04-19 RX ADMIN — Medication 2.5 MILLIGRAM(S): at 17:12

## 2021-04-19 RX ADMIN — HEPARIN SODIUM 5000 UNIT(S): 5000 INJECTION INTRAVENOUS; SUBCUTANEOUS at 17:12

## 2021-04-19 RX ADMIN — SODIUM CHLORIDE 100 MILLILITER(S): 9 INJECTION, SOLUTION INTRAVENOUS at 09:40

## 2021-04-19 RX ADMIN — MORPHINE SULFATE 4 MILLIGRAM(S): 50 CAPSULE, EXTENDED RELEASE ORAL at 06:14

## 2021-04-19 RX ADMIN — PIPERACILLIN AND TAZOBACTAM 200 GRAM(S): 4; .5 INJECTION, POWDER, LYOPHILIZED, FOR SOLUTION INTRAVENOUS at 09:41

## 2021-04-19 RX ADMIN — ONDANSETRON 4 MILLIGRAM(S): 8 TABLET, FILM COATED ORAL at 06:14

## 2021-04-19 RX ADMIN — SODIUM CHLORIDE 1000 MILLILITER(S): 9 INJECTION INTRAMUSCULAR; INTRAVENOUS; SUBCUTANEOUS at 06:14

## 2021-04-19 RX ADMIN — PANTOPRAZOLE SODIUM 40 MILLIGRAM(S): 20 TABLET, DELAYED RELEASE ORAL at 17:12

## 2021-04-19 NOTE — H&P ADULT - HISTORY OF PRESENT ILLNESS
CC: I was throwing up, not passing bowels, and pain in my belly.  Patient is a 81 y/o female w/PMH of HTN, DMII and PSH s/p hysterectomy, breast cyst removal bilateral, and s/p ex-lap w/appendectomy, peritoneal lavage, and lysis of adhesions (2/9/2021) presents to ED for two days of abdominal pain, vomiting for one day, and unable to pass bowel movements the last day. Since surgery on 2/9, she tells that she has been having a solid bowel movement daily. She was in her usual state of health until Saturday (4/18) morning when she had her last bowel movement. On Sunday (4/18) morning she had a small meal for breakfast, and then began vomiting, which was reported as her breakfast food and then light in color. Since Sunday, she has not eaten any other meals. At the time she started vomiting, she tells an onset of pain across her epigastric region. The pain comes and goes, and at first felt like gas, but not feels achy and cramping; at its worst the pain is 9/10, however, in ED she denied pain at that moment. She tells of chills Sunday night as well. She denies bloody vomit, bloody bowel movements, fever. CC: I was throwing up, not passing bowels, and pain in my belly.  Patient is a 81 y/o female w/PMH of HTN, DMII and PSH s/p hysterectomy, breast cyst removal bilateral, and s/p ex-lap w/appendectomy, peritoneal lavage, and lysis of adhesions (2/9/2021) presents to ED for two days of abdominal pain, vomiting for one day, and unable to pass bowel movements the last day. Since surgery on 2/9, she tells that she has been having a solid bowel movement daily. She was in her usual state of health until Saturday (4/18) morning when she had her last bowel movement, and has not passed gas since then. On Sunday (4/18) morning she had a small meal for breakfast, and then began vomiting - which was initially her breakfast food and then lighter in color. Since Sunday, she has not eaten any other meals. At the time she started vomiting, she tells an onset of pain across her epigastric region, and nausea which is made better by vomiting. The pain comes and goes, and at first felt like gas, but now when present feels achy, cramping, and sharp; at its worst the pain is 9/10, however, in ED she denied pain at that moment. She tells of chills Sunday night as well. She denies bloody vomit, bloody bowel movements, fever.

## 2021-04-19 NOTE — ED PROVIDER NOTE - CONSTITUTIONAL, MLM
Well appearing, awake, alert, oriented to person, place, time/situation and in moderate distress secondary to pain. normal...

## 2021-04-19 NOTE — H&P ADULT - PROBLEM SELECTOR PLAN 1
Admit to surgery  Plan for OR for repair of closed loop of bowel obstruction  NPO  NG Tube w/decompression  Analgesia for pain control   Ondansetron   IVF  Hernández - monitor urine output  VTE prophylaxis

## 2021-04-19 NOTE — H&P ADULT - NSHPREVIEWOFSYSTEMS_GEN_ALL_CORE
REVIEW OF SYSTEMS:  Constitutional: Denies fever, weight loss, fatigue  Eye: Denies eye pain, visual changes, discharge, blurred vision  ENT: Denies hearing changes, tinnitus, vertigo, sinus congestion, sore throat  Neck: Denies pain or stiffness  Respiratory: Denies cough, wheezing, chills, hemoptysis, shortness of breath, difficulty breathing  Cardiovascular: Denies chest pain, palpitations, dizziness, leg swelling  Gastrointestinal: Per HPI  Genitourinary: Denies dysuria, frequency, hematuria, retention, incontinence  Neurological: Denies headaches, memory loss, loss of strength, numbness, tremors  Skin: Denies itching, burning, rashes, lesions   Musculoskeletal: Denies joint pain or swelling, back, extremity pain  Psychiatric: Denies depression, anxiety, mood swings, difficulty sleeping, suicidal ideation  Hematology: Denies easy bruising, bleeding gums  Immunologic: Denies hives or eczema

## 2021-04-19 NOTE — PROGRESS NOTE ADULT - SUBJECTIVE AND OBJECTIVE BOX
80F s/p ex lap, FENG, reduction of internal hernia, abdominal washout    Patient is seen and examined at bedside.   Admits to abdominal pain, well controlled with medication.   Denies chest pain, shortness of breath, nausea, and vomiting.    Vital Signs Last 24 Hrs  T(F): 98.1 (04-19-21 @ 16:44), Max: 98.3 (04-19-21 @ 07:00)  HR: 65 (04-19-21 @ 16:44)  BP: 153/66 (04-19-21 @ 16:44)  RR: 16 (04-19-21 @ 16:44)  SpO2: 96% (04-19-21 @ 16:44)    GENERAL: Alert, oriented, NAD  HEENT: NGT to LWS, 100cc light brown output in canister  CHEST/LUNG: Clear to auscultation bilaterally, respirations nonlabored  HEART: +S1S2, RRR  ABDOMEN: Soft, nondistended. Midline aquacel c/d/i. Appropriate incisional tenderness  EXTREMITIES: No calf tenderness, no edema. Intermittent pneumatic compression devices b/l LE  : Hernández in situ draining clear, yellow urine    A/P: 80F PMH DM, HTN, s/p ex lap, FENG 2/2021, a/w closed loop SBO s/p ex lap, FENG, reduction of internal hernia  - local wound care per surgical team  - continue NGT to LWS, monitor output, GI ppx  - NPO, IV hydration  - pain meds PRN  - antiemetics PRN  - OOB, PT  - incentive spirometer  - DVT ppx  - meds for comorbidities 80F s/p ex lap, FENG, reduction of internal hernia, abdominal washout    Patient is seen and examined at bedside.   Admits to abdominal pain, well controlled with medication.   Denies chest pain, shortness of breath, nausea, and vomiting.    Vital Signs Last 24 Hrs  T(F): 98.1 (04-19-21 @ 16:44), Max: 98.3 (04-19-21 @ 07:00)  HR: 65 (04-19-21 @ 16:44)  BP: 153/66 (04-19-21 @ 16:44)  RR: 16 (04-19-21 @ 16:44)  SpO2: 96% (04-19-21 @ 16:44)    GENERAL: Alert, oriented, NAD  HEENT: NGT to LWS, 100cc light brown output in canister  CHEST/LUNG: Clear to auscultation bilaterally, respirations nonlabored  HEART: +S1S2, RRR  ABDOMEN: Soft, nondistended. Midline aquacel c/d/i. Appropriate incisional tenderness  EXTREMITIES: No calf tenderness, no edema. Intermittent pneumatic compression devices b/l LE  : Hernández in situ draining clear, yellow urine    A/P: 80F PMH DM, HTN, s/p ex lap, FENG 2/2021, a/w closed loop SBO s/p ex lap, FENG, reduction of internal hernia  - local wound care per surgical team  - continue NGT to LWS, monitor output, GI ppx  - NPO, IV hydration  - pain meds PRN, flexeril QHS  - antiemetics PRN  - OOB, PT  - incentive spirometer  - DVT ppx  - meds for comorbidities

## 2021-04-19 NOTE — ED ADULT NURSE NOTE - OBJECTIVE STATEMENT
pt complain of pain on the abdomen 8/10. pt states the pain was across the surgical wound. pt had ex laparotomy operation last february this year. surgical scar looks healed,, no redness noted. pt also complain of vomiting twice since yesterday, last bowel movement was saturday. denies fever, cp, sob.

## 2021-04-19 NOTE — H&P ADULT - NSICDXPASTSURGICALHX_GEN_ALL_CORE_FT
PAST SURGICAL HISTORY:  Breast cyst removal b/l    H/O abdominal hysterectomy for fibroids in 1980s    S/P exploratory laparotomy 2/9/2021: s/p ex lap, appendectomy, peritoneal lavage, lysis of intestinal adhesions

## 2021-04-19 NOTE — H&P ADULT - NSHPPHYSICALEXAM_GEN_ALL_CORE
Vital Signs Last 24 Hrs  T(C): 36.8 (19 Apr 2021 07:00), Max: 36.8 (19 Apr 2021 07:00)  T(F): 98.3 (19 Apr 2021 07:00), Max: 98.3 (19 Apr 2021 07:00)  HR: 78 (19 Apr 2021 07:00) (78 - 97)  BP: 174/84 (19 Apr 2021 07:00) (166/77 - 174/84)  RR: 18 (19 Apr 2021 07:00) (18 - 18)  SpO2: 100% (19 Apr 2021 07:00) (99% - 100%)    PHYSICAL EXAM:  GENERAL: NAD, well-groomed, well-developed  HEAD:  Atraumatic, Normocephalic  EYES: conjunctiva and sclera clear  ENMT: No tonsillar erythema, exudates, or enlargement  NECK: Supple, No JVD, Normal thyroid  NERVOUS SYSTEM:  Alert & Oriented X3, Good concentration  CHEST/LUNG: Clear to auscultation bilaterally; No rales, rhonchi, wheezing, or rubs  HEART: Regular rate and rhythm  ABDOMEN: Soft; Tympanitic to percussion RUQ/RLQ; Tender to palpation LLQ; Nondistended; Bowel sounds present x 4 quadrants  MSK: ROM intact in all extremities, 5/5 strength in upper and lower extremities, B/L.  EXTREMITIES:  2+ Peripheral Pulses, No clubbing, cyanosis, or edema  SKIN: Abdominal midline scar Vital Signs Last 24 Hrs  T(C): 36.8 (19 Apr 2021 07:00), Max: 36.8 (19 Apr 2021 07:00)  T(F): 98.3 (19 Apr 2021 07:00), Max: 98.3 (19 Apr 2021 07:00)  HR: 78 (19 Apr 2021 07:00) (78 - 97)  BP: 174/84 (19 Apr 2021 07:00) (166/77 - 174/84)  RR: 18 (19 Apr 2021 07:00) (18 - 18)  SpO2: 100% (19 Apr 2021 07:00) (99% - 100%)    PHYSICAL EXAM:  GENERAL: NAD, well-groomed, well-developed  HEAD:  Atraumatic, Normocephalic  EYES: conjunctiva and sclera clear  ENMT: No tonsillar erythema, exudates, or enlargement  NECK: Supple, No JVD, Normal thyroid  NERVOUS SYSTEM:  Alert & Oriented X3, Good concentration  CHEST/LUNG: Clear to auscultation bilaterally; No rales, rhonchi, wheezing, or rubs  HEART: Regular rate and rhythm  ABDOMEN: Soft; Tympanitic to percussion RUQ/RLQ, tenderness to percussion LLQ; Tender to palpation LLQ; Nondistended; Bowel sounds present x 4 quadrants  MSK: ROM intact in all extremities, 5/5 strength in upper and lower extremities, B/L.  EXTREMITIES:  2+ Peripheral Pulses, No clubbing, cyanosis, or edema  SKIN: Abdominal midline scar

## 2021-04-19 NOTE — H&P ADULT - ATTENDING COMMENTS
Patient seen and examined.   H/o hysterectomy. Last admission in February for closed loop s/p ex lap, washout, appendectomy. Presenting with 2 days of obstipation and bilious emesis with abdominal pain.  Lactate 1.9, no leukocytosis, mild MARY KAY.  CT with closed loop SBO without mesenteric edema, some free fluid in pelvis. No free air.  I spoke to the patient and to her daughter about indications/risks/benefits and alternatives to surgical intervention and they expressed understanding thereof. Agreeable to proceed with emergency surgery.  COVID negative, NGT placed, Hernández placed, IVF started, one dose of zosyn given. Plan for ex lap, possible lysis, possible resection.

## 2021-04-19 NOTE — BRIEF OPERATIVE NOTE - NSICDXBRIEFPROCEDURE_GEN_ALL_CORE_FT
PROCEDURES:  Exploratory laparotomy 19-Apr-2021 13:46:47  Evelyne Live  Lysis of intestinal adhesions 19-Apr-2021 13:47:10  Evelyne Live  Reduction of internal hernia 19-Apr-2021 13:47:21  Evelyne Live  Abdominal washout 19-Apr-2021 13:47:33  Evelyne Live

## 2021-04-19 NOTE — H&P ADULT - ASSESSMENT
Patient is a 79 y/o female w/PSH s/p hysterectomy, and s/p ex-lap w/appendectomy, peritoneal lavage, and lysis of adhesions (2/9/2021) presents to ED with closed loop bowel obstruction. BP elevated to 174/84. Lactate 1.9.

## 2021-04-19 NOTE — H&P ADULT - NSHPLABSRESULTS_GEN_ALL_CORE
13.1   8.15  )-----------( 339      ( 2021 06:12 )             39.1         142  |  101  |  22  ----------------------------<  173<H>  3.5   |  35<H>  |  1.11    Ca    10.7<H>      2021 06:12    TPro  8.7<H>  /  Alb  4.2  /  TBili  0.7  /  DBili  x   /  AST  17  /  ALT  20  /  AlkPhos  132<H>      PT/INR - ( 2021 06:12 )   PT: 12.9 sec;   INR: 1.12 ratio           PTT - ( 2021 06:12 )  PTT:32.5 sec  Urinalysis Basic - ( 2021 07:12 )    Color: Yellow / Appearance: Clear / S.015 / pH: x  Gluc: x / Ketone: Small  / Bili: Negative / Urobili: Negative mg/dL   Blood: x / Protein: 500 mg/dL / Nitrite: Negative   Leuk Esterase: Negative / RBC: 3-5 /HPF / WBC 3-5   Sq Epi: x / Non Sq Epi: Few / Bacteria: Few      CAPILLARY BLOOD GLUCOSE      RADIOLOGY  < from: CT Abdomen and Pelvis w/ IV Cont (21 @ 06:50) >    IMPRESSION:    Small bowel obstruction with 2 transition points in the left lower quadrant; closed loop obstruction cannot be excluded.    Findings discussed with Dr. LOPEZ on 2021 6:58 AM with readback.    < end of copied text >

## 2021-04-19 NOTE — ED ADULT NURSE REASSESSMENT NOTE - NS ED NURSE REASSESS COMMENT FT1
received pt to bed 5 awake alert and oriented times 4. noted with left forearm 20 guage iv. site intact. fluid bolus infusing. ngt placed to left nostril by p.a. small amount on brown collored drainage obtained. pt for KUB.

## 2021-04-20 LAB
A1C WITH ESTIMATED AVERAGE GLUCOSE RESULT: 5.6 % — SIGNIFICANT CHANGE UP (ref 4–5.6)
ANION GAP SERPL CALC-SCNC: 4 MMOL/L — LOW (ref 5–17)
BASOPHILS # BLD AUTO: 0.02 K/UL — SIGNIFICANT CHANGE UP (ref 0–0.2)
BASOPHILS NFR BLD AUTO: 0.2 % — SIGNIFICANT CHANGE UP (ref 0–2)
BUN SERPL-MCNC: 19 MG/DL — SIGNIFICANT CHANGE UP (ref 7–23)
CALCIUM SERPL-MCNC: 9 MG/DL — SIGNIFICANT CHANGE UP (ref 8.5–10.1)
CHLORIDE SERPL-SCNC: 109 MMOL/L — HIGH (ref 96–108)
CO2 SERPL-SCNC: 34 MMOL/L — HIGH (ref 22–31)
COVID-19 SPIKE DOMAIN AB INTERP: POSITIVE
COVID-19 SPIKE DOMAIN ANTIBODY RESULT: >250 U/ML — HIGH
CREAT SERPL-MCNC: 0.78 MG/DL — SIGNIFICANT CHANGE UP (ref 0.5–1.3)
EOSINOPHIL # BLD AUTO: 0.01 K/UL — SIGNIFICANT CHANGE UP (ref 0–0.5)
EOSINOPHIL NFR BLD AUTO: 0.1 % — SIGNIFICANT CHANGE UP (ref 0–6)
ESTIMATED AVERAGE GLUCOSE: 114 MG/DL — SIGNIFICANT CHANGE UP (ref 68–114)
GLUCOSE BLDC GLUCOMTR-MCNC: 102 MG/DL — HIGH (ref 70–99)
GLUCOSE BLDC GLUCOMTR-MCNC: 103 MG/DL — HIGH (ref 70–99)
GLUCOSE BLDC GLUCOMTR-MCNC: 109 MG/DL — HIGH (ref 70–99)
GLUCOSE BLDC GLUCOMTR-MCNC: 117 MG/DL — HIGH (ref 70–99)
GLUCOSE BLDC GLUCOMTR-MCNC: 117 MG/DL — HIGH (ref 70–99)
GLUCOSE SERPL-MCNC: 97 MG/DL — SIGNIFICANT CHANGE UP (ref 70–99)
HCT VFR BLD CALC: 34.8 % — SIGNIFICANT CHANGE UP (ref 34.5–45)
HGB BLD-MCNC: 11.2 G/DL — LOW (ref 11.5–15.5)
IMM GRANULOCYTES NFR BLD AUTO: 0.3 % — SIGNIFICANT CHANGE UP (ref 0–1.5)
LYMPHOCYTES # BLD AUTO: 0.99 K/UL — LOW (ref 1–3.3)
LYMPHOCYTES # BLD AUTO: 9.4 % — LOW (ref 13–44)
MAGNESIUM SERPL-MCNC: 2.3 MG/DL — SIGNIFICANT CHANGE UP (ref 1.6–2.6)
MCHC RBC-ENTMCNC: 24.9 PG — LOW (ref 27–34)
MCHC RBC-ENTMCNC: 32.2 GM/DL — SIGNIFICANT CHANGE UP (ref 32–36)
MCV RBC AUTO: 77.5 FL — LOW (ref 80–100)
MONOCYTES # BLD AUTO: 0.77 K/UL — SIGNIFICANT CHANGE UP (ref 0–0.9)
MONOCYTES NFR BLD AUTO: 7.3 % — SIGNIFICANT CHANGE UP (ref 2–14)
NEUTROPHILS # BLD AUTO: 8.76 K/UL — HIGH (ref 1.8–7.4)
NEUTROPHILS NFR BLD AUTO: 82.7 % — HIGH (ref 43–77)
NRBC # BLD: 0 /100 WBCS — SIGNIFICANT CHANGE UP (ref 0–0)
PHOSPHATE SERPL-MCNC: 2.6 MG/DL — SIGNIFICANT CHANGE UP (ref 2.5–4.5)
PLATELET # BLD AUTO: 259 K/UL — SIGNIFICANT CHANGE UP (ref 150–400)
POTASSIUM SERPL-MCNC: 3.2 MMOL/L — LOW (ref 3.5–5.3)
POTASSIUM SERPL-SCNC: 3.2 MMOL/L — LOW (ref 3.5–5.3)
RBC # BLD: 4.49 M/UL — SIGNIFICANT CHANGE UP (ref 3.8–5.2)
RBC # FLD: 15 % — HIGH (ref 10.3–14.5)
SARS-COV-2 IGG+IGM SERPL QL IA: >250 U/ML — HIGH
SARS-COV-2 IGG+IGM SERPL QL IA: POSITIVE
SODIUM SERPL-SCNC: 147 MMOL/L — HIGH (ref 135–145)
WBC # BLD: 10.58 K/UL — HIGH (ref 3.8–10.5)
WBC # FLD AUTO: 10.58 K/UL — HIGH (ref 3.8–10.5)

## 2021-04-20 PROCEDURE — 93010 ELECTROCARDIOGRAM REPORT: CPT

## 2021-04-20 RX ORDER — DEXTROSE MONOHYDRATE, SODIUM CHLORIDE, AND POTASSIUM CHLORIDE 50; .745; 4.5 G/1000ML; G/1000ML; G/1000ML
1000 INJECTION, SOLUTION INTRAVENOUS
Refills: 0 | Status: DISCONTINUED | OUTPATIENT
Start: 2021-04-20 | End: 2021-04-24

## 2021-04-20 RX ORDER — POTASSIUM CHLORIDE 20 MEQ
10 PACKET (EA) ORAL
Refills: 0 | Status: COMPLETED | OUTPATIENT
Start: 2021-04-20 | End: 2021-04-20

## 2021-04-20 RX ORDER — CYCLOBENZAPRINE HYDROCHLORIDE 10 MG/1
5 TABLET, FILM COATED ORAL AT BEDTIME
Refills: 0 | Status: COMPLETED | OUTPATIENT
Start: 2021-04-20 | End: 2021-04-21

## 2021-04-20 RX ADMIN — Medication 2.5 MILLIGRAM(S): at 17:35

## 2021-04-20 RX ADMIN — HEPARIN SODIUM 5000 UNIT(S): 5000 INJECTION INTRAVENOUS; SUBCUTANEOUS at 05:37

## 2021-04-20 RX ADMIN — Medication 100 MILLIEQUIVALENT(S): at 11:10

## 2021-04-20 RX ADMIN — DEXTROSE MONOHYDRATE, SODIUM CHLORIDE, AND POTASSIUM CHLORIDE 75 MILLILITER(S): 50; .745; 4.5 INJECTION, SOLUTION INTRAVENOUS at 08:19

## 2021-04-20 RX ADMIN — MORPHINE SULFATE 2 MILLIGRAM(S): 50 CAPSULE, EXTENDED RELEASE ORAL at 08:44

## 2021-04-20 RX ADMIN — HEPARIN SODIUM 5000 UNIT(S): 5000 INJECTION INTRAVENOUS; SUBCUTANEOUS at 17:35

## 2021-04-20 RX ADMIN — DEXTROSE MONOHYDRATE, SODIUM CHLORIDE, AND POTASSIUM CHLORIDE 75 MILLILITER(S): 50; .745; 4.5 INJECTION, SOLUTION INTRAVENOUS at 17:38

## 2021-04-20 RX ADMIN — Medication 100 MILLIEQUIVALENT(S): at 13:07

## 2021-04-20 RX ADMIN — Medication 2.5 MILLIGRAM(S): at 05:37

## 2021-04-20 RX ADMIN — PANTOPRAZOLE SODIUM 40 MILLIGRAM(S): 20 TABLET, DELAYED RELEASE ORAL at 11:10

## 2021-04-20 RX ADMIN — Medication 2.5 MILLIGRAM(S): at 11:10

## 2021-04-20 RX ADMIN — SODIUM CHLORIDE 100 MILLILITER(S): 9 INJECTION, SOLUTION INTRAVENOUS at 00:24

## 2021-04-20 RX ADMIN — MORPHINE SULFATE 2 MILLIGRAM(S): 50 CAPSULE, EXTENDED RELEASE ORAL at 08:29

## 2021-04-20 RX ADMIN — Medication 2.5 MILLIGRAM(S): at 00:24

## 2021-04-20 RX ADMIN — Medication 100 MILLIEQUIVALENT(S): at 09:37

## 2021-04-20 NOTE — PROGRESS NOTE ADULT - SUBJECTIVE AND OBJECTIVE BOX
Postoperative Day #: 1  Patient seen and examined bedside resting comfortably.  Reports mild abdominal soreness; discomfort is well controlled by medications.  Denies nausea and vomiting.   Denies chest pain, dyspnea, cough.    T(F): 97.8 (04-20-21 @ 02:50), Max: 98.3 (04-19-21 @ 07:00)  HR: 76 (04-20-21 @ 02:50) (62 - 88)  BP: 158/66 (04-20-21 @ 02:50) (146/68 - 174/84)  RR: 18 (04-20-21 @ 02:50) (15 - 18)  SpO2: 98% (04-20-21 @ 02:50) (95% - 100%)      POCT Blood Glucose.: 109 mg/dL (20 Apr 2021 06:34)  POCT Blood Glucose.: 117 mg/dL (20 Apr 2021 00:11)  POCT Blood Glucose.: 133 mg/dL (19 Apr 2021 16:01)  POCT Blood Glucose.: 118 mg/dL (19 Apr 2021 09:48)      PHYSICAL EXAM:  General: NAD, WDWN  Neuro:  Alert & oriented x 3  HEENT: NCAT, EOMI, conjunctiva clear. NGT to LWS with light brown output   CV: +S1+S2 regular rate and rhythm  Lung: clear to auscultation bilaterally, respirations nonlabored, good inspiratory effort  Abdomen: soft, nontender and nondistended. Midline aquacel dressing CDI.   Extremities: no pedal edema or calf tenderness noted    LABS:                     pending    A/P: 80F PMH DM, HTN, s/p ex lap, FENG 2/2021, a/w closed loop SBO POD#1 s/p ex lap, FENG, reduction of internal hernia  - local wound care per surgical team  - continue NGT to LWS, monitor output, GI ppx  - NPO, IV hydration  - pain meds PRN, flexeril QHS  - antiemetics PRN  - OOB, PT  - incentive spirometer  - DVT ppx  - meds for comorbidities     Postoperative Day #: 1  Patient seen and examined bedside resting comfortably.  Reports mild abdominal soreness; discomfort is well controlled by medications.  Denies nausea and vomiting.   Denies chest pain, dyspnea, cough.    T(F): 97.8 (04-20-21 @ 02:50), Max: 98.3 (04-19-21 @ 07:00)  HR: 76 (04-20-21 @ 02:50) (62 - 88)  BP: 158/66 (04-20-21 @ 02:50) (146/68 - 174/84)  RR: 18 (04-20-21 @ 02:50) (15 - 18)  SpO2: 98% (04-20-21 @ 02:50) (95% - 100%)      POCT Blood Glucose.: 109 mg/dL (20 Apr 2021 06:34)  POCT Blood Glucose.: 117 mg/dL (20 Apr 2021 00:11)  POCT Blood Glucose.: 133 mg/dL (19 Apr 2021 16:01)  POCT Blood Glucose.: 118 mg/dL (19 Apr 2021 09:48)      PHYSICAL EXAM:  General: NAD, WDWN  Neuro:  Alert & oriented x 3  HEENT: NCAT, EOMI, conjunctiva clear. NGT to LWS with light brown output   CV: +S1+S2 regular rate and rhythm  Lung: clear to auscultation bilaterally, respirations nonlabored, good inspiratory effort  Abdomen: soft, nontender and nondistended. Midline aquacel dressing CDI.   Extremities: no pedal edema or calf tenderness noted  : thomson catheter indwelling draining clear yellow urine    LABS:                     pending    A/P: 80F PMH DM, HTN, s/p ex lap, FENG 2/2021, a/w closed loop SBO POD#1 s/p ex lap, FENG, reduction of internal hernia  - local wound care per surgical team  - continue NGT to LWS, monitor output, GI ppx  - NPO, IV hydration  - pain meds PRN, flexeril QHS  - antiemetics PRN  - continue thomson for now  - OOB, PT  - incentive spirometer  - DVT ppx  - resume home PO meds once on PO diet. Cont BGM and ISS, IV antihypertensive

## 2021-04-20 NOTE — PROGRESS NOTE ADULT - SUBJECTIVE AND OBJECTIVE BOX
Pt stable; NGT in place  no n/v      MEDICATIONS  (STANDING):  acetaminophen  IVPB .. 1000 milliGRAM(s) IV Intermittent once  cyclobenzaprine 5 milliGRAM(s) Oral at bedtime  dextrose 40% Gel 15 Gram(s) Oral once  dextrose 5% + sodium chloride 0.45% with potassium chloride 20 mEq/L 1000 milliLiter(s) (75 mL/Hr) IV Continuous <Continuous>  dextrose 5%. 1000 milliLiter(s) (100 mL/Hr) IV Continuous <Continuous>  dextrose 5%. 1000 milliLiter(s) (50 mL/Hr) IV Continuous <Continuous>  dextrose 50% Injectable 25 Gram(s) IV Push once  dextrose 50% Injectable 12.5 Gram(s) IV Push once  dextrose 50% Injectable 25 Gram(s) IV Push once  glucagon  Injectable 1 milliGRAM(s) IntraMuscular once  heparin   Injectable 5000 Unit(s) SubCutaneous every 12 hours  insulin lispro (ADMELOG) corrective regimen sliding scale   SubCutaneous every 6 hours  metoprolol tartrate Injectable 2.5 milliGRAM(s) IV Push every 6 hours  pantoprazole  Injectable 40 milliGRAM(s) IV Push daily    MEDICATIONS  (PRN):  morphine  - Injectable 2 milliGRAM(s) IV Push every 4 hours PRN Severe Pain (7 - 10)  ondansetron Injectable 4 milliGRAM(s) IV Push every 6 hours PRN Nausea and/or Vomiting      Allergies    asa (Other)  No Known Drug Allergies    Intolerances        Vital Signs Last 24 Hrs  T(C): 37.1 (20 Apr 2021 10:51), Max: 37.1 (20 Apr 2021 10:51)  T(F): 98.8 (20 Apr 2021 10:51), Max: 98.8 (20 Apr 2021 10:51)  HR: 73 (20 Apr 2021 10:51) (62 - 79)  BP: 165/83 (20 Apr 2021 10:51) (146/68 - 165/83)  BP(mean): --  RR: 18 (20 Apr 2021 10:51) (16 - 18)  SpO2: 98% (20 Apr 2021 10:51) (95% - 98%)    PHYSICAL EXAM:  General: NAD.  CVS: S1, S2  Chest: air entry bilaterally present  Abd: soft, non-tender      LABS:                        11.2   10.58 )-----------( 259      ( 20 Apr 2021 08:19 )             34.8     04-20    147<H>  |  109<H>  |  19  ----------------------------<  97  3.2<L>   |  34<H>  |  0.78    Ca    9.0      20 Apr 2021 08:19  Phos  2.6     04-20  Mg     2.3     04-20    TPro  8.7<H>  /  Alb  4.2  /  TBili  0.7  /  DBili  x   /  AST  17  /  ALT  20  /  AlkPhos  132<H>  04-19    PT/INR - ( 19 Apr 2021 06:12 )   PT: 12.9 sec;   INR: 1.12 ratio         PTT - ( 19 Apr 2021 06:12 )  PTT:32.5 sec    continue as per surgery

## 2021-04-21 LAB
-  AMIKACIN: SIGNIFICANT CHANGE UP
-  AMOXICILLIN/CLAVULANIC ACID: SIGNIFICANT CHANGE UP
-  AMPICILLIN/SULBACTAM: SIGNIFICANT CHANGE UP
-  AMPICILLIN: SIGNIFICANT CHANGE UP
-  AZTREONAM: SIGNIFICANT CHANGE UP
-  CEFAZOLIN: SIGNIFICANT CHANGE UP
-  CEFEPIME: SIGNIFICANT CHANGE UP
-  CEFOXITIN: SIGNIFICANT CHANGE UP
-  CEFTRIAXONE: SIGNIFICANT CHANGE UP
-  CIPROFLOXACIN: SIGNIFICANT CHANGE UP
-  ERTAPENEM: SIGNIFICANT CHANGE UP
-  GENTAMICIN: SIGNIFICANT CHANGE UP
-  LEVOFLOXACIN: SIGNIFICANT CHANGE UP
-  MEROPENEM: SIGNIFICANT CHANGE UP
-  NITROFURANTOIN: SIGNIFICANT CHANGE UP
-  PIPERACILLIN/TAZOBACTAM: SIGNIFICANT CHANGE UP
-  TOBRAMYCIN: SIGNIFICANT CHANGE UP
-  TRIMETHOPRIM/SULFAMETHOXAZOLE: SIGNIFICANT CHANGE UP
ANION GAP SERPL CALC-SCNC: 3 MMOL/L — LOW (ref 5–17)
BUN SERPL-MCNC: 12 MG/DL — SIGNIFICANT CHANGE UP (ref 7–23)
CALCIUM SERPL-MCNC: 8.6 MG/DL — SIGNIFICANT CHANGE UP (ref 8.5–10.1)
CHLORIDE SERPL-SCNC: 111 MMOL/L — HIGH (ref 96–108)
CO2 SERPL-SCNC: 30 MMOL/L — SIGNIFICANT CHANGE UP (ref 22–31)
CREAT SERPL-MCNC: 0.62 MG/DL — SIGNIFICANT CHANGE UP (ref 0.5–1.3)
CULTURE RESULTS: SIGNIFICANT CHANGE UP
GLUCOSE BLDC GLUCOMTR-MCNC: 110 MG/DL — HIGH (ref 70–99)
GLUCOSE BLDC GLUCOMTR-MCNC: 123 MG/DL — HIGH (ref 70–99)
GLUCOSE BLDC GLUCOMTR-MCNC: 78 MG/DL — SIGNIFICANT CHANGE UP (ref 70–99)
GLUCOSE BLDC GLUCOMTR-MCNC: 92 MG/DL — SIGNIFICANT CHANGE UP (ref 70–99)
GLUCOSE SERPL-MCNC: 115 MG/DL — HIGH (ref 70–99)
HCT VFR BLD CALC: 32.2 % — LOW (ref 34.5–45)
HGB BLD-MCNC: 10.5 G/DL — LOW (ref 11.5–15.5)
MAGNESIUM SERPL-MCNC: 2.2 MG/DL — SIGNIFICANT CHANGE UP (ref 1.6–2.6)
MCHC RBC-ENTMCNC: 25.2 PG — LOW (ref 27–34)
MCHC RBC-ENTMCNC: 32.6 GM/DL — SIGNIFICANT CHANGE UP (ref 32–36)
MCV RBC AUTO: 77.2 FL — LOW (ref 80–100)
METHOD TYPE: SIGNIFICANT CHANGE UP
NRBC # BLD: 0 /100 WBCS — SIGNIFICANT CHANGE UP (ref 0–0)
ORGANISM # SPEC MICROSCOPIC CNT: SIGNIFICANT CHANGE UP
ORGANISM # SPEC MICROSCOPIC CNT: SIGNIFICANT CHANGE UP
PHOSPHATE SERPL-MCNC: 1.6 MG/DL — LOW (ref 2.5–4.5)
PLATELET # BLD AUTO: 245 K/UL — SIGNIFICANT CHANGE UP (ref 150–400)
POTASSIUM SERPL-MCNC: 3.5 MMOL/L — SIGNIFICANT CHANGE UP (ref 3.5–5.3)
POTASSIUM SERPL-SCNC: 3.5 MMOL/L — SIGNIFICANT CHANGE UP (ref 3.5–5.3)
RBC # BLD: 4.17 M/UL — SIGNIFICANT CHANGE UP (ref 3.8–5.2)
RBC # FLD: 15.1 % — HIGH (ref 10.3–14.5)
SODIUM SERPL-SCNC: 144 MMOL/L — SIGNIFICANT CHANGE UP (ref 135–145)
SPECIMEN SOURCE: SIGNIFICANT CHANGE UP
WBC # BLD: 7.08 K/UL — SIGNIFICANT CHANGE UP (ref 3.8–10.5)
WBC # FLD AUTO: 7.08 K/UL — SIGNIFICANT CHANGE UP (ref 3.8–10.5)

## 2021-04-21 RX ORDER — METOPROLOL TARTRATE 50 MG
5 TABLET ORAL EVERY 6 HOURS
Refills: 0 | Status: DISCONTINUED | OUTPATIENT
Start: 2021-04-21 | End: 2021-04-22

## 2021-04-21 RX ORDER — POTASSIUM PHOSPHATE, MONOBASIC POTASSIUM PHOSPHATE, DIBASIC 236; 224 MG/ML; MG/ML
15 INJECTION, SOLUTION INTRAVENOUS ONCE
Refills: 0 | Status: COMPLETED | OUTPATIENT
Start: 2021-04-21 | End: 2021-04-21

## 2021-04-21 RX ADMIN — Medication 2.5 MILLIGRAM(S): at 05:26

## 2021-04-21 RX ADMIN — Medication 5 MILLIGRAM(S): at 17:26

## 2021-04-21 RX ADMIN — PANTOPRAZOLE SODIUM 40 MILLIGRAM(S): 20 TABLET, DELAYED RELEASE ORAL at 11:32

## 2021-04-21 RX ADMIN — HEPARIN SODIUM 5000 UNIT(S): 5000 INJECTION INTRAVENOUS; SUBCUTANEOUS at 05:26

## 2021-04-21 RX ADMIN — POTASSIUM PHOSPHATE, MONOBASIC POTASSIUM PHOSPHATE, DIBASIC 62.5 MILLIMOLE(S): 236; 224 INJECTION, SOLUTION INTRAVENOUS at 11:33

## 2021-04-21 RX ADMIN — Medication 5 MILLIGRAM(S): at 11:33

## 2021-04-21 RX ADMIN — Medication 400 MILLIGRAM(S): at 17:26

## 2021-04-21 RX ADMIN — Medication 2.5 MILLIGRAM(S): at 00:25

## 2021-04-21 RX ADMIN — DEXTROSE MONOHYDRATE, SODIUM CHLORIDE, AND POTASSIUM CHLORIDE 75 MILLILITER(S): 50; .745; 4.5 INJECTION, SOLUTION INTRAVENOUS at 05:28

## 2021-04-21 RX ADMIN — HEPARIN SODIUM 5000 UNIT(S): 5000 INJECTION INTRAVENOUS; SUBCUTANEOUS at 17:26

## 2021-04-21 NOTE — PROGRESS NOTE ADULT - SUBJECTIVE AND OBJECTIVE BOX
Patient seen and examined at bedside in no acute distress.  Pt did not ask for any pain meds overnight, says she does not need any now.   Denies passing flatus or BM yet.   Pt is eager for NGT to be removed. Edgar joaquin yesterday, patient voiding w/o issues.   Reports being OOB to bathroom yesterday.     Vital Signs Last 24 Hrs  T(F): 97.9 (04-21-21 @ 05:00), Max: 99.9 (04-20-21 @ 17:07)  HR: 74 (04-21-21 @ 05:00)  BP: 178/73 (04-21-21 @ 05:00)  RR: 18 (04-21-21 @ 05:00)  SpO2: 97% (04-21-21 @ 05:00)      POCT Blood Glucose.: 123 mg/dL (21 Apr 2021 05:35)      GENERAL: Alert, NAD  HEENT: NGT to LWS draining minimal gastric secretions overnight (115cc)  CHEST/LUNG: Respirations nonlabored  HEART: Regular rate and rhythm  ABDOMEN: Aquacel dressing clean/intact. Hypoactive bowel sounds, soft, Nontender, Nondistended, no rebound or guarding noted  EXTREMITIES:  no calf tenderness, No edema    I&O's Detail    19 Apr 2021 07:01  -  20 Apr 2021 07:00  --------------------------------------------------------  IN:    Lactated Ringers: 1200 mL    Oral Fluid: 120 mL  Total IN: 1320 mL    OUT:    Indwelling Catheter - Urethral (mL): 1600 mL    Nasogastric/Oral tube (mL): 160 mL  Total OUT: 1760 mL    Total NET: -440 mL      20 Apr 2021 07:01  -  21 Apr 2021 06:15  --------------------------------------------------------  IN:    dextrose 5% + sodium chloride 0.45% w/ Additives: 1725 mL  Total IN: 1725 mL    OUT:    Nasogastric/Oral tube (mL): 115 mL  Total OUT: 115 mL    Total NET: 1610 mL      LABS:                        11.2   10.58 )-----------( 259      ( 20 Apr 2021 08:19 )             34.8     04-20    147<H>  |  109<H>  |  19  ----------------------------<  97  3.2<L>   |  34<H>  |  0.78    Ca    9.0      20 Apr 2021 08:19  Phos  2.6     04-20  Mg     2.3     04-20

## 2021-04-21 NOTE — PROGRESS NOTE ADULT - SUBJECTIVE AND OBJECTIVE BOX
Pt stable  feeling better - did not need pain meds  Wants NGT out      MEDICATIONS  (STANDING):  acetaminophen  IVPB .. 1000 milliGRAM(s) IV Intermittent once  cyclobenzaprine 5 milliGRAM(s) Oral at bedtime  dextrose 40% Gel 15 Gram(s) Oral once  dextrose 5% + sodium chloride 0.45% with potassium chloride 20 mEq/L 1000 milliLiter(s) (75 mL/Hr) IV Continuous <Continuous>  dextrose 5%. 1000 milliLiter(s) (100 mL/Hr) IV Continuous <Continuous>  dextrose 5%. 1000 milliLiter(s) (50 mL/Hr) IV Continuous <Continuous>  dextrose 50% Injectable 25 Gram(s) IV Push once  dextrose 50% Injectable 12.5 Gram(s) IV Push once  dextrose 50% Injectable 25 Gram(s) IV Push once  glucagon  Injectable 1 milliGRAM(s) IntraMuscular once  heparin   Injectable 5000 Unit(s) SubCutaneous every 12 hours  insulin lispro (ADMELOG) corrective regimen sliding scale   SubCutaneous every 6 hours  metoprolol tartrate Injectable 5 milliGRAM(s) IV Push every 6 hours  pantoprazole  Injectable 40 milliGRAM(s) IV Push daily  potassium phosphate IVPB 15 milliMole(s) IV Intermittent once    MEDICATIONS  (PRN):  morphine  - Injectable 2 milliGRAM(s) IV Push every 4 hours PRN Severe Pain (7 - 10)  ondansetron Injectable 4 milliGRAM(s) IV Push every 6 hours PRN Nausea and/or Vomiting      Allergies    asa (Other)  No Known Drug Allergies    Intolerances        Vital Signs Last 24 Hrs  T(C): 36.6 (21 Apr 2021 05:00), Max: 37.7 (20 Apr 2021 17:07)  T(F): 97.9 (21 Apr 2021 05:00), Max: 99.9 (20 Apr 2021 17:07)  HR: 74 (21 Apr 2021 05:00) (74 - 88)  BP: 178/73 (21 Apr 2021 05:00) (165/69 - 178/73)  BP(mean): --  RR: 18 (21 Apr 2021 05:00) (17 - 18)  SpO2: 97% (21 Apr 2021 05:00) (97% - 97%)    PHYSICAL EXAM:  General: NAD.  CVS: S1, S2  Chest: air entry bilaterally present  Abd: BS present, soft, non-tender      LABS:                        10.5   7.08  )-----------( 245      ( 21 Apr 2021 07:51 )             32.2     04-21    144  |  111<H>  |  12  ----------------------------<  115<H>  3.5   |  30  |  0.62    Ca    8.6      21 Apr 2021 07:51  Phos  1.6     04-21  Mg     2.2     04-21        continue as per surgery

## 2021-04-22 LAB
ANION GAP SERPL CALC-SCNC: 4 MMOL/L — LOW (ref 5–17)
BUN SERPL-MCNC: 11 MG/DL — SIGNIFICANT CHANGE UP (ref 7–23)
CALCIUM SERPL-MCNC: 8.6 MG/DL — SIGNIFICANT CHANGE UP (ref 8.5–10.1)
CHLORIDE SERPL-SCNC: 112 MMOL/L — HIGH (ref 96–108)
CO2 SERPL-SCNC: 27 MMOL/L — SIGNIFICANT CHANGE UP (ref 22–31)
CREAT SERPL-MCNC: 0.59 MG/DL — SIGNIFICANT CHANGE UP (ref 0.5–1.3)
GLUCOSE BLDC GLUCOMTR-MCNC: 100 MG/DL — HIGH (ref 70–99)
GLUCOSE BLDC GLUCOMTR-MCNC: 105 MG/DL — HIGH (ref 70–99)
GLUCOSE BLDC GLUCOMTR-MCNC: 112 MG/DL — HIGH (ref 70–99)
GLUCOSE BLDC GLUCOMTR-MCNC: 84 MG/DL — SIGNIFICANT CHANGE UP (ref 70–99)
GLUCOSE BLDC GLUCOMTR-MCNC: 91 MG/DL — SIGNIFICANT CHANGE UP (ref 70–99)
GLUCOSE BLDC GLUCOMTR-MCNC: 92 MG/DL — SIGNIFICANT CHANGE UP (ref 70–99)
GLUCOSE SERPL-MCNC: 99 MG/DL — SIGNIFICANT CHANGE UP (ref 70–99)
HCT VFR BLD CALC: 30.7 % — LOW (ref 34.5–45)
HGB BLD-MCNC: 10 G/DL — LOW (ref 11.5–15.5)
MAGNESIUM SERPL-MCNC: 2.1 MG/DL — SIGNIFICANT CHANGE UP (ref 1.6–2.6)
MCHC RBC-ENTMCNC: 24.9 PG — LOW (ref 27–34)
MCHC RBC-ENTMCNC: 32.6 GM/DL — SIGNIFICANT CHANGE UP (ref 32–36)
MCV RBC AUTO: 76.4 FL — LOW (ref 80–100)
NRBC # BLD: 0 /100 WBCS — SIGNIFICANT CHANGE UP (ref 0–0)
PHOSPHATE SERPL-MCNC: 2.1 MG/DL — LOW (ref 2.5–4.5)
PLATELET # BLD AUTO: 235 K/UL — SIGNIFICANT CHANGE UP (ref 150–400)
POTASSIUM SERPL-MCNC: 3.6 MMOL/L — SIGNIFICANT CHANGE UP (ref 3.5–5.3)
POTASSIUM SERPL-SCNC: 3.6 MMOL/L — SIGNIFICANT CHANGE UP (ref 3.5–5.3)
RBC # BLD: 4.02 M/UL — SIGNIFICANT CHANGE UP (ref 3.8–5.2)
RBC # FLD: 14.7 % — HIGH (ref 10.3–14.5)
SODIUM SERPL-SCNC: 143 MMOL/L — SIGNIFICANT CHANGE UP (ref 135–145)
WBC # BLD: 5.38 K/UL — SIGNIFICANT CHANGE UP (ref 3.8–10.5)
WBC # FLD AUTO: 5.38 K/UL — SIGNIFICANT CHANGE UP (ref 3.8–10.5)

## 2021-04-22 PROCEDURE — 99222 1ST HOSP IP/OBS MODERATE 55: CPT

## 2021-04-22 RX ORDER — POTASSIUM PHOSPHATE, MONOBASIC POTASSIUM PHOSPHATE, DIBASIC 236; 224 MG/ML; MG/ML
15 INJECTION, SOLUTION INTRAVENOUS ONCE
Refills: 0 | Status: COMPLETED | OUTPATIENT
Start: 2021-04-22 | End: 2021-04-22

## 2021-04-22 RX ORDER — HYDRALAZINE HCL 50 MG
10 TABLET ORAL EVERY 4 HOURS
Refills: 0 | Status: DISCONTINUED | OUTPATIENT
Start: 2021-04-22 | End: 2021-04-23

## 2021-04-22 RX ORDER — HYDRALAZINE HCL 50 MG
10 TABLET ORAL EVERY 4 HOURS
Refills: 0 | Status: DISCONTINUED | OUTPATIENT
Start: 2021-04-22 | End: 2021-04-22

## 2021-04-22 RX ORDER — METOPROLOL TARTRATE 50 MG
5 TABLET ORAL EVERY 6 HOURS
Refills: 0 | Status: DISCONTINUED | OUTPATIENT
Start: 2021-04-22 | End: 2021-04-23

## 2021-04-22 RX ORDER — METOPROLOL TARTRATE 50 MG
10 TABLET ORAL EVERY 6 HOURS
Refills: 0 | Status: DISCONTINUED | OUTPATIENT
Start: 2021-04-22 | End: 2021-04-22

## 2021-04-22 RX ADMIN — HEPARIN SODIUM 5000 UNIT(S): 5000 INJECTION INTRAVENOUS; SUBCUTANEOUS at 17:14

## 2021-04-22 RX ADMIN — DEXTROSE MONOHYDRATE, SODIUM CHLORIDE, AND POTASSIUM CHLORIDE 75 MILLILITER(S): 50; .745; 4.5 INJECTION, SOLUTION INTRAVENOUS at 00:13

## 2021-04-22 RX ADMIN — PANTOPRAZOLE SODIUM 40 MILLIGRAM(S): 20 TABLET, DELAYED RELEASE ORAL at 11:22

## 2021-04-22 RX ADMIN — DEXTROSE MONOHYDRATE, SODIUM CHLORIDE, AND POTASSIUM CHLORIDE 75 MILLILITER(S): 50; .745; 4.5 INJECTION, SOLUTION INTRAVENOUS at 11:22

## 2021-04-22 RX ADMIN — MORPHINE SULFATE 2 MILLIGRAM(S): 50 CAPSULE, EXTENDED RELEASE ORAL at 08:45

## 2021-04-22 RX ADMIN — HEPARIN SODIUM 5000 UNIT(S): 5000 INJECTION INTRAVENOUS; SUBCUTANEOUS at 05:48

## 2021-04-22 RX ADMIN — Medication 10 MILLIGRAM(S): at 14:35

## 2021-04-22 RX ADMIN — MORPHINE SULFATE 2 MILLIGRAM(S): 50 CAPSULE, EXTENDED RELEASE ORAL at 08:31

## 2021-04-22 RX ADMIN — Medication 5 MILLIGRAM(S): at 23:47

## 2021-04-22 RX ADMIN — Medication 5 MILLIGRAM(S): at 17:14

## 2021-04-22 RX ADMIN — Medication 5 MILLIGRAM(S): at 05:48

## 2021-04-22 RX ADMIN — POTASSIUM PHOSPHATE, MONOBASIC POTASSIUM PHOSPHATE, DIBASIC 62.5 MILLIMOLE(S): 236; 224 INJECTION, SOLUTION INTRAVENOUS at 14:35

## 2021-04-22 RX ADMIN — Medication 5 MILLIGRAM(S): at 11:22

## 2021-04-22 RX ADMIN — Medication 5 MILLIGRAM(S): at 00:14

## 2021-04-22 NOTE — PROGRESS NOTE ADULT - SUBJECTIVE AND OBJECTIVE BOX
Pt remains NPO- as per surgery  NGT removed  s/p expolratory laparotomy      MEDICATIONS  (STANDING):  dextrose 40% Gel 15 Gram(s) Oral once  dextrose 5% + sodium chloride 0.45% with potassium chloride 20 mEq/L 1000 milliLiter(s) (75 mL/Hr) IV Continuous <Continuous>  dextrose 5%. 1000 milliLiter(s) (100 mL/Hr) IV Continuous <Continuous>  dextrose 5%. 1000 milliLiter(s) (50 mL/Hr) IV Continuous <Continuous>  dextrose 50% Injectable 25 Gram(s) IV Push once  dextrose 50% Injectable 12.5 Gram(s) IV Push once  dextrose 50% Injectable 25 Gram(s) IV Push once  glucagon  Injectable 1 milliGRAM(s) IntraMuscular once  heparin   Injectable 5000 Unit(s) SubCutaneous every 12 hours  hydrALAZINE Injectable 10 milliGRAM(s) IV Push every 4 hours  insulin lispro (ADMELOG) corrective regimen sliding scale   SubCutaneous every 6 hours  metoprolol tartrate Injectable 5 milliGRAM(s) IV Push every 6 hours  pantoprazole  Injectable 40 milliGRAM(s) IV Push daily  potassium phosphate IVPB 15 milliMole(s) IV Intermittent once    MEDICATIONS  (PRN):  morphine  - Injectable 2 milliGRAM(s) IV Push every 4 hours PRN Severe Pain (7 - 10)  ondansetron Injectable 4 milliGRAM(s) IV Push every 6 hours PRN Nausea and/or Vomiting      Allergies    asa (Other)  No Known Drug Allergies    Intolerances        Vital Signs Last 24 Hrs  T(C): 36.7 (22 Apr 2021 11:15), Max: 37.3 (21 Apr 2021 16:25)  T(F): 98.1 (22 Apr 2021 11:15), Max: 99.2 (21 Apr 2021 16:25)  HR: 62 (22 Apr 2021 11:15) (62 - 70)  BP: 185/72 (22 Apr 2021 11:15) (180/80 - 191/78)  BP(mean): --  RR: 18 (22 Apr 2021 11:15) (18 - 18)  SpO2: 100% (22 Apr 2021 11:15) (98% - 100%)    PHYSICAL EXAM:  General: NAD.  CVS: S1, S2  Chest: air entry bilaterally present  Abd: BS present, soft, non-tender      LABS:                        10.0   5.38  )-----------( 235      ( 22 Apr 2021 07:20 )             30.7     04-22    143  |  112<H>  |  11  ----------------------------<  99  3.6   |  27  |  0.59    Ca    8.6      22 Apr 2021 07:20  Phos  2.1     04-22  Mg     2.1     04-22        will resume diet when bowel function returns

## 2021-04-22 NOTE — CONSULT NOTE ADULT - ASSESSMENT
80F PMH DM, HTN, s/p ex lap, FENG 2/2021, a/w closed loop SBO         SBO  POD#3 s/p ex lap, FENG, reduction of internal hernia- management per surgery    Elevated BP   -has been off home meds due to npo  -start ivp hydralazine w parameters, already on metoprolol  monitor bp     DM   sugars, stable  c/w ivf  resume meds when tolerating po

## 2021-04-22 NOTE — CONSULT NOTE ADULT - SUBJECTIVE AND OBJECTIVE BOX
HPI:  CC: I was throwing up, not passing bowels, and pain in my belly.  Patient is a 81 y/o female w/PMH of HTN, DMII and PSH s/p hysterectomy, breast cyst removal bilateral, and s/p ex-lap w/appendectomy, peritoneal lavage, and lysis of adhesions (2/9/2021) presents to ED for two days of abdominal pain, vomiting for one day, and unable to pass bowel movements the last day. Since surgery on 2/9, she tells that she has been having a solid bowel movement daily. She was in her usual state of health until Saturday (4/18) morning when she had her last bowel movement, and has not passed gas since then. On Sunday (4/18) morning she had a small meal for breakfast, and then began vomiting - which was initially her breakfast food and then lighter in color. Since Sunday, she has not eaten any other meals. At the time she started vomiting, she tells an onset of pain across her epigastric region, and nausea which is made better by vomiting. The pain comes and goes, and at first felt like gas, but now when present feels achy, cramping, and sharp; at its worst the pain is 9/10, however, in ED she denied pain at that moment. She tells of chills Sunday night as well. She denies bloody vomit, bloody bowel movements, fever. (19 Apr 2021 07:44)      PAST MEDICAL & SURGICAL HISTORY:  DM (diabetes mellitus)    HTN (hypertension)    H/O abdominal hysterectomy  for fibroids in 1980s    Breast cyst  removal b/l    S/P exploratory laparotomy  2/9/2021: s/p ex lap, appendectomy, peritoneal lavage, lysis of intestinal adhesions        REVIEW OF SYSTEMS:    CONSTITUTIONAL: No fever, weight loss, or fatigue  EYES: No eye pain, visual disturbances, or discharge  ENMT:  No difficulty hearing, tinnitus, vertigo; No sinus or throat pain  NECK: No pain or stiffness  BREASTS: No pain, masses, or nipple discharge  RESPIRATORY: No cough, wheezing, chills or hemoptysis; No shortness of breath  CARDIOVASCULAR: No chest pain, palpitations, dizziness, or leg swelling  GASTROINTESTINAL: No abdominal or epigastric pain. No nausea, vomiting, or hematemesis; No diarrhea or constipation. No melena or hematochezia.  GENITOURINARY: No dysuria, frequency, hematuria, or incontinence  NEUROLOGICAL: No headaches, memory loss, loss of strength, numbness, or tremors  SKIN: No itching, burning, rashes, or lesions   LYMPH NODES: No enlarged glands  ENDOCRINE: No heat or cold intolerance; No hair loss  MUSCULOSKELETAL: No joint pain or swelling; No muscle, back, or extremity pain  PSYCHIATRIC: No depression, anxiety, mood swings, or difficulty sleeping  HEME/LYMPH: No easy bruising, or bleeding gums  ALLERGY AND IMMUNOLOGIC: No hives or eczema      MEDICATIONS  (STANDING):  dextrose 40% Gel 15 Gram(s) Oral once  dextrose 5% + sodium chloride 0.45% with potassium chloride 20 mEq/L 1000 milliLiter(s) (75 mL/Hr) IV Continuous <Continuous>  dextrose 5%. 1000 milliLiter(s) (100 mL/Hr) IV Continuous <Continuous>  dextrose 5%. 1000 milliLiter(s) (50 mL/Hr) IV Continuous <Continuous>  dextrose 50% Injectable 25 Gram(s) IV Push once  dextrose 50% Injectable 12.5 Gram(s) IV Push once  dextrose 50% Injectable 25 Gram(s) IV Push once  glucagon  Injectable 1 milliGRAM(s) IntraMuscular once  heparin   Injectable 5000 Unit(s) SubCutaneous every 12 hours  hydrALAZINE Injectable 10 milliGRAM(s) IV Push every 4 hours  insulin lispro (ADMELOG) corrective regimen sliding scale   SubCutaneous every 6 hours  metoprolol tartrate Injectable 5 milliGRAM(s) IV Push every 6 hours  pantoprazole  Injectable 40 milliGRAM(s) IV Push daily  potassium phosphate IVPB 15 milliMole(s) IV Intermittent once    MEDICATIONS  (PRN):  morphine  - Injectable 2 milliGRAM(s) IV Push every 4 hours PRN Severe Pain (7 - 10)  ondansetron Injectable 4 milliGRAM(s) IV Push every 6 hours PRN Nausea and/or Vomiting      Allergies    asa (Other)  No Known Drug Allergies    Intolerances        SOCIAL HISTORY:    FAMILY HISTORY:  FH: hypertension        Vital Signs Last 24 Hrs  T(C): 36.7 (22 Apr 2021 11:15), Max: 37.3 (21 Apr 2021 16:25)  T(F): 98.1 (22 Apr 2021 11:15), Max: 99.2 (21 Apr 2021 16:25)  HR: 62 (22 Apr 2021 11:15) (62 - 70)  BP: 185/72 (22 Apr 2021 11:15) (180/80 - 191/78)  BP(mean): --  RR: 18 (22 Apr 2021 11:15) (18 - 18)  SpO2: 100% (22 Apr 2021 11:15) (98% - 100%)    PHYSICAL EXAM:    GENERAL: NAD, well-groomed, well-developed  HEAD:  Atraumatic, Normocephalic  EYES: EOMI, PERRLA, conjunctiva and sclera clear  ENMT: No tonsillar erythema, exudates, or enlargement; Moist mucous membranes, Good dentition, No lesions NG tube   NECK: Supple, No JVD, Normal thyroid  NERVOUS SYSTEM:  Alert & Oriented X3, Good concentration; Motor Strength 5/5 B/L upper and lower extremities; DTRs 2+ intact and symmetric  CHEST/LUNG: Clear to percussion bilaterally; No rales, rhonchi, wheezing, or rubs  HEART: Regular rate and rhythm; No murmurs, rubs, or gallops  ABDOMEN: Soft, Nontender, Nondistended; Bowel sounds present Dressing CDI   EXTREMITIES:  2+ Peripheral Pulses, No clubbing, cyanosis, or edema  LYMPH: No lymphadenopathy noted  SKIN: No rashes or lesions      LABS:                        10.0   5.38  )-----------( 235      ( 22 Apr 2021 07:20 )             30.7     04-22    143  |  112<H>  |  11  ----------------------------<  99  3.6   |  27  |  0.59    Ca    8.6      22 Apr 2021 07:20  Phos  2.1     04-22  Mg     2.1     04-22            RADIOLOGY & ADDITIONAL STUDIES:
full consult dictated    Plan: Asked by family to see patient. Pt well known to me for many years - presented with bowel obstruction. Pt seen by surgery. Scheduled for exploratory laparotomy. R/O adhesions from prior surgeries. As per surgery

## 2021-04-22 NOTE — PROGRESS NOTE ADULT - SUBJECTIVE AND OBJECTIVE BOX
Postoperative Day #:3  Patient seen and examined at bedside resting comfortably, NGT dislodged overnight, denies n/v or abdominal pain. Pt states she has been ambulating with walker. Denies Flatus or BM  Denies chest pain, dyspnea, cough.    T(F): 99 (04-21-21 @ 23:51), Max: 99.2 (04-21-21 @ 16:25)  HR: 70 (04-21-21 @ 23:51) (70 - 74)  BP: 188/80 (04-21-21 @ 23:51) (175/76 - 188/80)  RR: 18 (04-21-21 @ 23:51) (17 - 18)  SpO2: 99% (04-21-21 @ 23:51) (97% - 99%)  Wt(kg): --  CAPILLARY BLOOD GLUCOSE      POCT Blood Glucose.: 105 mg/dL (22 Apr 2021 00:11)  POCT Blood Glucose.: 92 mg/dL (21 Apr 2021 17:02)  POCT Blood Glucose.: 78 mg/dL (21 Apr 2021 11:11)  POCT Blood Glucose.: 123 mg/dL (21 Apr 2021 05:35)      GENERAL: Alert, NAD  HEENT: NGT to LWS draining minimal gastric secretions overnight (115cc)  CHEST/LUNG: Respirations nonlabored  HEART: Regular rate and rhythm  ABDOMEN: Aquacel dressing clean/intact. Hypoactive bowel sounds, soft, Nontender, Nondistended, no rebound or guarding noted  EXTREMITIES:  no calf tenderness, No edema    LABS: Pending                          I&O's Detail    20 Apr 2021 07:01  -  21 Apr 2021 07:00  --------------------------------------------------------  IN:    dextrose 5% + sodium chloride 0.45% w/ Additives: 1725 mL  Total IN: 1725 mL    OUT:    Nasogastric/Oral tube (mL): 115 mL  Total OUT: 115 mL    Total NET: 1610 mL        Culture Results:   10,000 - 49,000 CFU/mL Proteus mirabilis (04-19 @ 11:16)      Impression: 80F PMH DM, HTN, s/p ex lap, FENG 2/2021, a/w closed loop SBO POD#3 s/p ex lap, FENG, reduction of internal hernia- stable    Plan  -Continue to monitor for bowel function.   - NPO, IVF.   -cont oob to ambulation with walker  -F/u am labs.   -DVT prophylaxis.   -possible medical consult for BP management  -Will discuss with surgical attending.

## 2021-04-23 LAB
ANION GAP SERPL CALC-SCNC: 6 MMOL/L — SIGNIFICANT CHANGE UP (ref 5–17)
BUN SERPL-MCNC: 11 MG/DL — SIGNIFICANT CHANGE UP (ref 7–23)
CALCIUM SERPL-MCNC: 9.4 MG/DL — SIGNIFICANT CHANGE UP (ref 8.5–10.1)
CHLORIDE SERPL-SCNC: 112 MMOL/L — HIGH (ref 96–108)
CO2 SERPL-SCNC: 27 MMOL/L — SIGNIFICANT CHANGE UP (ref 22–31)
CREAT SERPL-MCNC: 0.78 MG/DL — SIGNIFICANT CHANGE UP (ref 0.5–1.3)
GLUCOSE BLDC GLUCOMTR-MCNC: 121 MG/DL — HIGH (ref 70–99)
GLUCOSE BLDC GLUCOMTR-MCNC: 92 MG/DL — SIGNIFICANT CHANGE UP (ref 70–99)
GLUCOSE BLDC GLUCOMTR-MCNC: 94 MG/DL — SIGNIFICANT CHANGE UP (ref 70–99)
GLUCOSE SERPL-MCNC: 98 MG/DL — SIGNIFICANT CHANGE UP (ref 70–99)
HCT VFR BLD CALC: 36.4 % — SIGNIFICANT CHANGE UP (ref 34.5–45)
HGB BLD-MCNC: 11.4 G/DL — LOW (ref 11.5–15.5)
MAGNESIUM SERPL-MCNC: 2.3 MG/DL — SIGNIFICANT CHANGE UP (ref 1.6–2.6)
MCHC RBC-ENTMCNC: 25.1 PG — LOW (ref 27–34)
MCHC RBC-ENTMCNC: 31.3 GM/DL — LOW (ref 32–36)
MCV RBC AUTO: 80 FL — SIGNIFICANT CHANGE UP (ref 80–100)
NRBC # BLD: 0 /100 WBCS — SIGNIFICANT CHANGE UP (ref 0–0)
PHOSPHATE SERPL-MCNC: 2.8 MG/DL — SIGNIFICANT CHANGE UP (ref 2.5–4.5)
PLATELET # BLD AUTO: 288 K/UL — SIGNIFICANT CHANGE UP (ref 150–400)
POTASSIUM SERPL-MCNC: 4.1 MMOL/L — SIGNIFICANT CHANGE UP (ref 3.5–5.3)
POTASSIUM SERPL-SCNC: 4.1 MMOL/L — SIGNIFICANT CHANGE UP (ref 3.5–5.3)
RBC # BLD: 4.55 M/UL — SIGNIFICANT CHANGE UP (ref 3.8–5.2)
RBC # FLD: 14.6 % — HIGH (ref 10.3–14.5)
SODIUM SERPL-SCNC: 145 MMOL/L — SIGNIFICANT CHANGE UP (ref 135–145)
WBC # BLD: 5.44 K/UL — SIGNIFICANT CHANGE UP (ref 3.8–10.5)
WBC # FLD AUTO: 5.44 K/UL — SIGNIFICANT CHANGE UP (ref 3.8–10.5)

## 2021-04-23 PROCEDURE — 99233 SBSQ HOSP IP/OBS HIGH 50: CPT

## 2021-04-23 PROCEDURE — 74018 RADEX ABDOMEN 1 VIEW: CPT | Mod: 26

## 2021-04-23 RX ORDER — AMLODIPINE BESYLATE 2.5 MG/1
5 TABLET ORAL DAILY
Refills: 0 | Status: DISCONTINUED | OUTPATIENT
Start: 2021-04-23 | End: 2021-04-25

## 2021-04-23 RX ORDER — TRIAMTERENE/HYDROCHLOROTHIAZID 75 MG-50MG
1 TABLET ORAL DAILY
Refills: 0 | Status: DISCONTINUED | OUTPATIENT
Start: 2021-04-23 | End: 2021-04-25

## 2021-04-23 RX ORDER — ACETAMINOPHEN 500 MG
1000 TABLET ORAL ONCE
Refills: 0 | Status: COMPLETED | OUTPATIENT
Start: 2021-04-23 | End: 2021-04-23

## 2021-04-23 RX ORDER — MAGNESIUM SULFATE 500 MG/ML
2 VIAL (ML) INJECTION ONCE
Refills: 0 | Status: DISCONTINUED | OUTPATIENT
Start: 2021-04-23 | End: 2021-04-23

## 2021-04-23 RX ADMIN — Medication 10 MILLIGRAM(S): at 17:16

## 2021-04-23 RX ADMIN — Medication 10 MILLIGRAM(S): at 16:20

## 2021-04-23 RX ADMIN — DEXTROSE MONOHYDRATE, SODIUM CHLORIDE, AND POTASSIUM CHLORIDE 75 MILLILITER(S): 50; .745; 4.5 INJECTION, SOLUTION INTRAVENOUS at 05:44

## 2021-04-23 RX ADMIN — HEPARIN SODIUM 5000 UNIT(S): 5000 INJECTION INTRAVENOUS; SUBCUTANEOUS at 05:45

## 2021-04-23 RX ADMIN — Medication 5 MILLIGRAM(S): at 05:45

## 2021-04-23 RX ADMIN — HEPARIN SODIUM 5000 UNIT(S): 5000 INJECTION INTRAVENOUS; SUBCUTANEOUS at 17:16

## 2021-04-23 RX ADMIN — PANTOPRAZOLE SODIUM 40 MILLIGRAM(S): 20 TABLET, DELAYED RELEASE ORAL at 11:48

## 2021-04-23 RX ADMIN — Medication 1000 MILLIGRAM(S): at 10:15

## 2021-04-23 RX ADMIN — Medication 400 MILLIGRAM(S): at 09:47

## 2021-04-23 RX ADMIN — Medication 1 TABLET(S): at 21:50

## 2021-04-23 RX ADMIN — AMLODIPINE BESYLATE 5 MILLIGRAM(S): 2.5 TABLET ORAL at 21:50

## 2021-04-23 RX ADMIN — Medication 5 MILLIGRAM(S): at 11:48

## 2021-04-23 NOTE — DIETITIAN INITIAL EVALUATION ADULT. - PERTINENT LABORATORY DATA
04-23 Na145 mmol/L Glu 98 mg/dL K+ 4.1 mmol/L Cr  0.78 mg/dL BUN 11 mg/dL 04-23 Phos 2.8 mg/dL 04-19 Alb 4.2 g/dL  04-20 HbA1c 5.6%   04-23 Finger Sticks 121mg/dL

## 2021-04-23 NOTE — PROGRESS NOTE ADULT - SUBJECTIVE AND OBJECTIVE BOX
Patient is a 80y old  Female who presents with a chief complaint of Abdominal pain and vomiting (23 Apr 2021 11:38)      INTERVAL HPI/OVERNIGHT EVENTS: no events     MEDICATIONS  (STANDING):  dextrose 40% Gel 15 Gram(s) Oral once  dextrose 5% + sodium chloride 0.45% with potassium chloride 20 mEq/L 1000 milliLiter(s) (75 mL/Hr) IV Continuous <Continuous>  dextrose 5%. 1000 milliLiter(s) (100 mL/Hr) IV Continuous <Continuous>  dextrose 5%. 1000 milliLiter(s) (50 mL/Hr) IV Continuous <Continuous>  dextrose 50% Injectable 25 Gram(s) IV Push once  dextrose 50% Injectable 12.5 Gram(s) IV Push once  dextrose 50% Injectable 25 Gram(s) IV Push once  glucagon  Injectable 1 milliGRAM(s) IntraMuscular once  heparin   Injectable 5000 Unit(s) SubCutaneous every 12 hours  hydrALAZINE Injectable 10 milliGRAM(s) IV Push every 4 hours  insulin lispro (ADMELOG) corrective regimen sliding scale   SubCutaneous every 6 hours  metoprolol tartrate Injectable 5 milliGRAM(s) IV Push every 6 hours  pantoprazole  Injectable 40 milliGRAM(s) IV Push daily    MEDICATIONS  (PRN):  bisacodyl Suppository 10 milliGRAM(s) Rectal daily PRN Constipation  ondansetron Injectable 4 milliGRAM(s) IV Push every 6 hours PRN Nausea and/or Vomiting      Allergies    asa (Other)  No Known Drug Allergies    Intolerances             Vital Signs Last 24 Hrs  T(C): 36.7 (23 Apr 2021 11:08), Max: 37 (22 Apr 2021 16:04)  T(F): 98 (23 Apr 2021 11:08), Max: 98.6 (22 Apr 2021 16:04)  HR: 66 (23 Apr 2021 11:08) (65 - 75)  BP: 157/70 (23 Apr 2021 11:08) (157/70 - 178/73)  BP(mean): --  RR: 17 (23 Apr 2021 11:08) (15 - 18)  SpO2: 100% (23 Apr 2021 11:08) (98% - 100%)    PHYSICAL EXAM:  GENERAL: NAD, well-groomed, well-developed  HEAD:  Atraumatic, Normocephalic  EYES: EOMI, PERRLA, conjunctiva and sclera clear  ENMT: No tonsillar erythema, exudates, or enlargement; Moist mucous membranes, Good dentition, No lesions NG tube   NECK: Supple, No JVD, Normal thyroid  NERVOUS SYSTEM:  Alert & Oriented X3, Good concentration; Motor Strength 5/5 B/L upper and lower extremities; DTRs 2+ intact and symmetric  CHEST/LUNG: Clear to percussion bilaterally; No rales, rhonchi, wheezing, or rubs  HEART: Regular rate and rhythm; No murmurs, rubs, or gallops  ABDOMEN: Soft, Nontender, Nondistended; Bowel sounds present Dressing CDI   EXTREMITIES:  2+ Peripheral Pulses, No clubbing, cyanosis, or edema  LYMPH: No lymphadenopathy noted  SKIN: No rashes or lesions    LABS:                        11.4   5.44  )-----------( 288      ( 23 Apr 2021 08:22 )             36.4     04-23    145  |  112<H>  |  11  ----------------------------<  98  4.1   |  27  |  0.78    Ca    9.4      23 Apr 2021 08:22  Phos  2.8     04-23  Mg     2.3     04-23          CAPILLARY BLOOD GLUCOSE      POCT Blood Glucose.: 94 mg/dL (23 Apr 2021 10:51)  POCT Blood Glucose.: 121 mg/dL (23 Apr 2021 05:47)  POCT Blood Glucose.: 112 mg/dL (22 Apr 2021 23:25)  POCT Blood Glucose.: 100 mg/dL (22 Apr 2021 20:37)  POCT Blood Glucose.: 84 mg/dL (22 Apr 2021 16:08)      RADIOLOGY & ADDITIONAL TESTS:    Imaging Personally Reviewed:  [ X] YES  [ ] NO    Consultant(s) Notes Reviewed:  [ X] YES  [ ] NO    Care Discussed with Consultants/Other Providers [X ] YES  [ ] NO

## 2021-04-23 NOTE — PROGRESS NOTE ADULT - ATTENDING COMMENTS
Patient seen and examined.    Pain controlled, no nausea, no flatus/BM's yet. Voiding well. Walked down [place] yesterday  Hypertensive to systolics in 170's  Abdomen soft, appropriately tender, mildly distended  Hypokalemic    Replete electrolytes  Ambulate as tolerated  Remove NGT with flatus  Lopressor increased to 5mg q6hrs, will place patient on home meds when diet advanced
Patient seen and examined.  Ongoing ileus, no nausea/vomiting. NGT dislodged overnight, currently not in place.  Abdomen is soft, non distended, appropriately tender, with clean midline dressing    Electrolyte repletions  IVF, NPO until function  Maximal mobilization, which patient understands Is important  Increase metoprolol to 10mg q6h with parameters, home meds to be restarted when patient tolerating diet.
Patient seen and examined.    Pain well controlled, no nausea/vomiting. No flatus/BM yet  BP better controlled with hydralazine and lopressor  Abdomen soft, non distended, appropriately tender. Incision with staples is clean, dry, and intact.  Labs reviewed    OOB/ ambulation  KUB, possible dulcolax suppository
Patient seen and examined.  Pain well controlled, no flatus/BM's yet, no nausea.  AVSS  NGT in place, 100cc output/24 hrs  Abdomen soft, appropriately tender, non distended  Hernández removed, patient has not voided yet  Labs reviewed, slight post operative leukocytosis and hypokalemia    Replete electrolytes  mIVF  PT/mobilize  F/u GI fxn, remove NGT with flatus  F/u void

## 2021-04-23 NOTE — DIETITIAN INITIAL EVALUATION ADULT. - ORAL INTAKE PTA/DIET HISTORY
Pt reports good appetite and PO intake PTA. Per H&P pt presented with severe abdominal pain and vomiting. States she shops/cooks for self and has no diet restrictions PTA.

## 2021-04-23 NOTE — DIETITIAN INITIAL EVALUATION ADULT. - PERTINENT MEDS FT
MEDICATIONS  (STANDING):  dextrose 40% Gel 15 Gram(s) Oral once  dextrose 5% + sodium chloride 0.45% with potassium chloride 20 mEq/L 1000 milliLiter(s) (75 mL/Hr) IV Continuous <Continuous>  dextrose 5%. 1000 milliLiter(s) (50 mL/Hr) IV Continuous <Continuous>  dextrose 5%. 1000 milliLiter(s) (100 mL/Hr) IV Continuous <Continuous>  dextrose 50% Injectable 25 Gram(s) IV Push once  dextrose 50% Injectable 12.5 Gram(s) IV Push once  dextrose 50% Injectable 25 Gram(s) IV Push once  glucagon  Injectable 1 milliGRAM(s) IntraMuscular once  heparin   Injectable 5000 Unit(s) SubCutaneous every 12 hours  hydrALAZINE Injectable 10 milliGRAM(s) IV Push every 4 hours  insulin lispro (ADMELOG) corrective regimen sliding scale   SubCutaneous every 6 hours  metoprolol tartrate Injectable 5 milliGRAM(s) IV Push every 6 hours  pantoprazole  Injectable 40 milliGRAM(s) IV Push daily    MEDICATIONS  (PRN):  bisacodyl Suppository 10 milliGRAM(s) Rectal daily PRN Constipation  ondansetron Injectable 4 milliGRAM(s) IV Push every 6 hours PRN Nausea and/or Vomiting

## 2021-04-23 NOTE — DIETITIAN INITIAL EVALUATION ADULT. - OTHER CALCULATIONS
Ht (cm): 162cm   Wt (kg): 63.5kg (dosing weight 04/19)   BMI: 24     IBW: 54.4kg +/- 10% %IBW: 117% UBW: 63.5kg %UBW: 100%

## 2021-04-23 NOTE — DIETITIAN INITIAL EVALUATION ADULT. - OTHER INFO
Pt s/p ex-lap for SBO 04/19. Currently NPO x 4 days. States no history of chewing/swallowing difficulty. Pt denies N/V; states no BM since surgery (04/19). Reports weight stable;  pounds.   Pt with T2DM; states she takes Metformin at home. Reports she checks her blood sugars 1-2x/day with "good" numbers. HbA1c 5.6% indicates good blood glucose management.  Explained diet progression to pt at surgical teams discretion. Pt made aware RD remains available.

## 2021-04-23 NOTE — PROGRESS NOTE ADULT - SUBJECTIVE AND OBJECTIVE BOX
Pt stable - feeling better  Pt feels "gurgling" in stomach      MEDICATIONS  (STANDING):  dextrose 40% Gel 15 Gram(s) Oral once  dextrose 5% + sodium chloride 0.45% with potassium chloride 20 mEq/L 1000 milliLiter(s) (75 mL/Hr) IV Continuous <Continuous>  dextrose 5%. 1000 milliLiter(s) (50 mL/Hr) IV Continuous <Continuous>  dextrose 5%. 1000 milliLiter(s) (100 mL/Hr) IV Continuous <Continuous>  dextrose 50% Injectable 25 Gram(s) IV Push once  dextrose 50% Injectable 12.5 Gram(s) IV Push once  dextrose 50% Injectable 25 Gram(s) IV Push once  glucagon  Injectable 1 milliGRAM(s) IntraMuscular once  heparin   Injectable 5000 Unit(s) SubCutaneous every 12 hours  hydrALAZINE Injectable 10 milliGRAM(s) IV Push every 4 hours  insulin lispro (ADMELOG) corrective regimen sliding scale   SubCutaneous every 6 hours  metoprolol tartrate Injectable 5 milliGRAM(s) IV Push every 6 hours  pantoprazole  Injectable 40 milliGRAM(s) IV Push daily    MEDICATIONS  (PRN):  bisacodyl Suppository 10 milliGRAM(s) Rectal daily PRN Constipation  ondansetron Injectable 4 milliGRAM(s) IV Push every 6 hours PRN Nausea and/or Vomiting      Allergies    asa (Other)  No Known Drug Allergies    Intolerances        Vital Signs Last 24 Hrs  T(C): 36.7 (23 Apr 2021 11:08), Max: 37 (22 Apr 2021 16:04)  T(F): 98 (23 Apr 2021 11:08), Max: 98.6 (22 Apr 2021 16:04)  HR: 66 (23 Apr 2021 11:08) (65 - 75)  BP: 157/70 (23 Apr 2021 11:08) (157/70 - 178/73)  BP(mean): --  RR: 17 (23 Apr 2021 11:08) (15 - 18)  SpO2: 100% (23 Apr 2021 11:08) (98% - 100%)    PHYSICAL EXAM:  General: NAD.  CVS: S1, S2  Chest: air entry bilaterally present  Abd: BS present, soft, non-tender      LABS:                        11.4   5.44  )-----------( 288      ( 23 Apr 2021 08:22 )             36.4     04-23    145  |  112<H>  |  11  ----------------------------<  98  4.1   |  27  |  0.78    Ca    9.4      23 Apr 2021 08:22  Phos  2.8     04-23  Mg     2.3     04-23      continue as per surgery  will await surgical decision regarding diet

## 2021-04-23 NOTE — PROGRESS NOTE ADULT - SUBJECTIVE AND OBJECTIVE BOX
INTERVAL HPI/OVERNIGHT EVENTS: None    STATUS POST:  Ex lap, Lysis of adhesions reduction of internal hernia    POST OPERATIVE DAY #: 4    SUBJECTIVE: Patient seen and examined at bedside, patient without complaints.   Abdominal pain is well controlled. Has been ambulating with walker. Denies Flatus or BM.  Denies nausea and vomiting.  Denies chest pain, dyspnea, cough.      MEDICATIONS  (STANDING):  dextrose 40% Gel 15 Gram(s) Oral once  dextrose 5% + sodium chloride 0.45% with potassium chloride 20 mEq/L 1000 milliLiter(s) (75 mL/Hr) IV Continuous <Continuous>  dextrose 5%. 1000 milliLiter(s) (100 mL/Hr) IV Continuous <Continuous>  dextrose 5%. 1000 milliLiter(s) (50 mL/Hr) IV Continuous <Continuous>  dextrose 50% Injectable 25 Gram(s) IV Push once  dextrose 50% Injectable 12.5 Gram(s) IV Push once  dextrose 50% Injectable 25 Gram(s) IV Push once  glucagon  Injectable 1 milliGRAM(s) IntraMuscular once  heparin   Injectable 5000 Unit(s) SubCutaneous every 12 hours  hydrALAZINE Injectable 10 milliGRAM(s) IV Push every 4 hours  insulin lispro (ADMELOG) corrective regimen sliding scale   SubCutaneous every 6 hours  metoprolol tartrate Injectable 5 milliGRAM(s) IV Push every 6 hours  pantoprazole  Injectable 40 milliGRAM(s) IV Push daily    MEDICATIONS  (PRN):  morphine  - Injectable 2 milliGRAM(s) IV Push every 4 hours PRN Severe Pain (7 - 10)  ondansetron Injectable 4 milliGRAM(s) IV Push every 6 hours PRN Nausea and/or Vomiting      Vital Signs Last 24 Hrs  T(C): 36.9 (23 Apr 2021 05:29), Max: 37 (22 Apr 2021 16:04)  T(F): 98.5 (23 Apr 2021 05:29), Max: 98.6 (22 Apr 2021 16:04)  HR: 65 (23 Apr 2021 05:29) (62 - 75)  BP: 164/76 (23 Apr 2021 05:29) (161/67 - 185/72)  RR: 18 (23 Apr 2021 05:29) (15 - 18)  SpO2: 99% (23 Apr 2021 05:29) (98% - 100%)    Physical Exam  GENERAL: Alert, NAD  HEENT: Normocephalic, EOMI  CHEST/LUNG: Respirations nonlabored  HEART: Regular rate and rhythm  ABDOMEN: Aquacel dressing clean/intact. Hypoactive bowel sounds, soft, Nontender, Nondistended, no rebound or guarding noted  EXTREMITIES:  no calf tenderness, No edema    I&O's Detail    21 Apr 2021 07:01  -  22 Apr 2021 07:00  --------------------------------------------------------  IN:    dextrose 5% + sodium chloride 0.45% w/ Additives: 900 mL  Total IN: 900 mL    OUT:  Total OUT: 0 mL    Total NET: 900 mL      22 Apr 2021 07:01  -  23 Apr 2021 06:00  --------------------------------------------------------  IN:    dextrose 5% + sodium chloride 0.45% w/ Additives: 900 mL  Total IN: 900 mL    OUT:  Total OUT: 0 mL    Total NET: 900 mL          LABS:                        10.0   5.38  )-----------( 235      ( 22 Apr 2021 07:20 )             30.7     04-22    143  |  112<H>  |  11  ----------------------------<  99  3.6   |  27  |  0.59    Ca    8.6      22 Apr 2021 07:20  Phos  2.1     04-22  Mg     2.1     04-22

## 2021-04-24 LAB
ANION GAP SERPL CALC-SCNC: 8 MMOL/L — SIGNIFICANT CHANGE UP (ref 5–17)
BUN SERPL-MCNC: 9 MG/DL — SIGNIFICANT CHANGE UP (ref 7–23)
CALCIUM SERPL-MCNC: 9.2 MG/DL — SIGNIFICANT CHANGE UP (ref 8.5–10.1)
CHLORIDE SERPL-SCNC: 108 MMOL/L — SIGNIFICANT CHANGE UP (ref 96–108)
CO2 SERPL-SCNC: 24 MMOL/L — SIGNIFICANT CHANGE UP (ref 22–31)
CREAT SERPL-MCNC: 0.75 MG/DL — SIGNIFICANT CHANGE UP (ref 0.5–1.3)
GLUCOSE BLDC GLUCOMTR-MCNC: 109 MG/DL — HIGH (ref 70–99)
GLUCOSE BLDC GLUCOMTR-MCNC: 83 MG/DL — SIGNIFICANT CHANGE UP (ref 70–99)
GLUCOSE BLDC GLUCOMTR-MCNC: 98 MG/DL — SIGNIFICANT CHANGE UP (ref 70–99)
GLUCOSE BLDC GLUCOMTR-MCNC: 99 MG/DL — SIGNIFICANT CHANGE UP (ref 70–99)
GLUCOSE SERPL-MCNC: 99 MG/DL — SIGNIFICANT CHANGE UP (ref 70–99)
HCT VFR BLD CALC: 34.1 % — LOW (ref 34.5–45)
HGB BLD-MCNC: 11.5 G/DL — SIGNIFICANT CHANGE UP (ref 11.5–15.5)
MAGNESIUM SERPL-MCNC: 2.1 MG/DL — SIGNIFICANT CHANGE UP (ref 1.6–2.6)
MCHC RBC-ENTMCNC: 25.3 PG — LOW (ref 27–34)
MCHC RBC-ENTMCNC: 33.7 GM/DL — SIGNIFICANT CHANGE UP (ref 32–36)
MCV RBC AUTO: 75.1 FL — LOW (ref 80–100)
NRBC # BLD: 0 /100 WBCS — SIGNIFICANT CHANGE UP (ref 0–0)
PHOSPHATE SERPL-MCNC: 3.1 MG/DL — SIGNIFICANT CHANGE UP (ref 2.5–4.5)
PLATELET # BLD AUTO: 277 K/UL — SIGNIFICANT CHANGE UP (ref 150–400)
POTASSIUM SERPL-MCNC: 3.4 MMOL/L — LOW (ref 3.5–5.3)
POTASSIUM SERPL-SCNC: 3.4 MMOL/L — LOW (ref 3.5–5.3)
RBC # BLD: 4.54 M/UL — SIGNIFICANT CHANGE UP (ref 3.8–5.2)
RBC # FLD: 14.6 % — HIGH (ref 10.3–14.5)
SODIUM SERPL-SCNC: 140 MMOL/L — SIGNIFICANT CHANGE UP (ref 135–145)
WBC # BLD: 5.51 K/UL — SIGNIFICANT CHANGE UP (ref 3.8–10.5)
WBC # FLD AUTO: 5.51 K/UL — SIGNIFICANT CHANGE UP (ref 3.8–10.5)

## 2021-04-24 PROCEDURE — 99233 SBSQ HOSP IP/OBS HIGH 50: CPT

## 2021-04-24 RX ORDER — PANTOPRAZOLE SODIUM 20 MG/1
40 TABLET, DELAYED RELEASE ORAL
Refills: 0 | Status: DISCONTINUED | OUTPATIENT
Start: 2021-04-25 | End: 2021-04-25

## 2021-04-24 RX ORDER — POTASSIUM CHLORIDE 20 MEQ
40 PACKET (EA) ORAL ONCE
Refills: 0 | Status: COMPLETED | OUTPATIENT
Start: 2021-04-24 | End: 2021-04-24

## 2021-04-24 RX ORDER — PANTOPRAZOLE SODIUM 20 MG/1
40 TABLET, DELAYED RELEASE ORAL
Refills: 0 | Status: DISCONTINUED | OUTPATIENT
Start: 2021-04-24 | End: 2021-04-24

## 2021-04-24 RX ADMIN — Medication 40 MILLIEQUIVALENT(S): at 09:09

## 2021-04-24 RX ADMIN — AMLODIPINE BESYLATE 5 MILLIGRAM(S): 2.5 TABLET ORAL at 06:25

## 2021-04-24 RX ADMIN — PANTOPRAZOLE SODIUM 40 MILLIGRAM(S): 20 TABLET, DELAYED RELEASE ORAL at 11:07

## 2021-04-24 RX ADMIN — Medication 1 TABLET(S): at 06:26

## 2021-04-24 RX ADMIN — DEXTROSE MONOHYDRATE, SODIUM CHLORIDE, AND POTASSIUM CHLORIDE 75 MILLILITER(S): 50; .745; 4.5 INJECTION, SOLUTION INTRAVENOUS at 06:28

## 2021-04-24 RX ADMIN — HEPARIN SODIUM 5000 UNIT(S): 5000 INJECTION INTRAVENOUS; SUBCUTANEOUS at 06:25

## 2021-04-24 RX ADMIN — HEPARIN SODIUM 5000 UNIT(S): 5000 INJECTION INTRAVENOUS; SUBCUTANEOUS at 17:10

## 2021-04-24 NOTE — PROGRESS NOTE ADULT - SUBJECTIVE AND OBJECTIVE BOX
Postoperative Day #:5  Patient seen and examined bedside resting comfortably. +BMx2 overnight.  No complaints offered.  Abdominal pain is well controlled.  Denies nausea and vomiting. Tolerating clear liquid diet.  Denies chest pain, dyspnea, cough.    T(F): 98.9 (04-24-21 @ 05:18), Max: 98.9 (04-24-21 @ 05:18)  HR: 92 (04-24-21 @ 05:18) (66 - 101)  BP: 157/70 (04-24-21 @ 05:18) (154/72 - 185/74)  RR: 18 (04-24-21 @ 05:18) (16 - 18)  SpO2: 98% (04-24-21 @ 05:18) (96% - 100%)  Wt(kg): --  CAPILLARY BLOOD GLUCOSE      POCT Blood Glucose.: 99 mg/dL (24 Apr 2021 06:35)  POCT Blood Glucose.: 109 mg/dL (24 Apr 2021 00:05)  POCT Blood Glucose.: 92 mg/dL (23 Apr 2021 15:46)  POCT Blood Glucose.: 94 mg/dL (23 Apr 2021 10:51)    Physical Exam  GENERAL: Alert, NAD  HEENT: Normocephalic, EOMI  CHEST/LUNG: Respirations nonlabored  HEART: Regular rate and rhythm  ABDOMEN: dry dressing removed from incision, staples intact, no active bleeding or signs of infection, incision left to air. + bowel sounds, soft, Nontender, Nondistended, no rebound or guarding noted  EXTREMITIES:  no calf tenderness, No edema      LABS:                        11.5   5.51  )-----------( 277      ( 24 Apr 2021 07:06 )             34.1     04-24    140  |  108  |  9   ----------------------------<  99  3.4<L>   |  24  |  0.75    Ca    9.2      24 Apr 2021 07:06  Phos  3.1     04-24  Mg     2.1     04-24        I&O's Detail    23 Apr 2021 07:01  -  24 Apr 2021 07:00  --------------------------------------------------------  IN:    dextrose 5% + sodium chloride 0.45% w/ Additives: 900 mL  Total IN: 900 mL    OUT:  Total OUT: 0 mL    Total NET: 900 mL        Culture Results:   10,000 - 49,000 CFU/mL Proteus mirabilis (04-19 @ 11:16)      Impression: 80F PMH DM, HTN, s/p ex lap, FENG 2/2021, a/w closed loop SBO POD#5 s/p ex lap, FENG, reduction of internal hernia- stable    Plan  -advance diet as tolerated  -cont oob to ambulation with walker  -Incentive spirometer  -F/u am labs.   -DVT prophylaxis.   -d/c planning to home with home PT  -Will discuss with surgical attending.

## 2021-04-24 NOTE — PROGRESS NOTE ADULT - SUBJECTIVE AND OBJECTIVE BOX
Pt doing much better  POD #3  Pt had BM  started on PO - tolerating  diet      MEDICATIONS  (STANDING):  amLODIPine   Tablet 5 milliGRAM(s) Oral daily  dextrose 40% Gel 15 Gram(s) Oral once  dextrose 5%. 1000 milliLiter(s) (100 mL/Hr) IV Continuous <Continuous>  dextrose 5%. 1000 milliLiter(s) (50 mL/Hr) IV Continuous <Continuous>  dextrose 50% Injectable 25 Gram(s) IV Push once  dextrose 50% Injectable 12.5 Gram(s) IV Push once  dextrose 50% Injectable 25 Gram(s) IV Push once  glucagon  Injectable 1 milliGRAM(s) IntraMuscular once  heparin   Injectable 5000 Unit(s) SubCutaneous every 12 hours  insulin lispro (ADMELOG) corrective regimen sliding scale   SubCutaneous every 6 hours  triamterene 37.5 mG/hydrochlorothiazide 25 mG Tablet 1 Tablet(s) Oral daily    MEDICATIONS  (PRN):  bisacodyl Suppository 10 milliGRAM(s) Rectal daily PRN Constipation  ondansetron Injectable 4 milliGRAM(s) IV Push every 6 hours PRN Nausea and/or Vomiting      Allergies    asa (Other)  No Known Drug Allergies    Intolerances        Vital Signs Last 24 Hrs  T(C): 37.1 (24 Apr 2021 11:00), Max: 37.2 (24 Apr 2021 05:18)  T(F): 98.7 (24 Apr 2021 11:00), Max: 98.9 (24 Apr 2021 05:18)  HR: 97 (24 Apr 2021 11:00) (70 - 101)  BP: 144/83 (24 Apr 2021 11:00) (144/83 - 171/73)  BP(mean): --  RR: 17 (24 Apr 2021 11:00) (17 - 18)  SpO2: 99% (24 Apr 2021 11:00) (96% - 99%)    PHYSICAL EXAM:  General: NAD.  CVS: S1, S2  Chest: air entry bilaterally present  Abd: BS present, soft, non-tender      LABS:                        11.5   5.51  )-----------( 277      ( 24 Apr 2021 07:06 )             34.1     04-24    140  |  108  |  9   ----------------------------<  99  3.4<L>   |  24  |  0.75    Ca    9.2      24 Apr 2021 07:06  Phos  3.1     04-24  Mg     2.1     04-24        Advance diet as tolerated  as per surgery

## 2021-04-24 NOTE — PROGRESS NOTE ADULT - SUBJECTIVE AND OBJECTIVE BOX
Patient is a 80y old  Female who presents with a chief complaint of Abdominal pain and vomiting (24 Apr 2021 06:18)      INTERVAL HPI/OVERNIGHT EVENTS: no events     MEDICATIONS  (STANDING):  amLODIPine   Tablet 5 milliGRAM(s) Oral daily  dextrose 40% Gel 15 Gram(s) Oral once  dextrose 5% + sodium chloride 0.45% with potassium chloride 20 mEq/L 1000 milliLiter(s) (75 mL/Hr) IV Continuous <Continuous>  dextrose 5%. 1000 milliLiter(s) (100 mL/Hr) IV Continuous <Continuous>  dextrose 5%. 1000 milliLiter(s) (50 mL/Hr) IV Continuous <Continuous>  dextrose 50% Injectable 25 Gram(s) IV Push once  dextrose 50% Injectable 12.5 Gram(s) IV Push once  dextrose 50% Injectable 25 Gram(s) IV Push once  glucagon  Injectable 1 milliGRAM(s) IntraMuscular once  heparin   Injectable 5000 Unit(s) SubCutaneous every 12 hours  insulin lispro (ADMELOG) corrective regimen sliding scale   SubCutaneous every 6 hours  pantoprazole  Injectable 40 milliGRAM(s) IV Push daily  triamterene 37.5 mG/hydrochlorothiazide 25 mG Tablet 1 Tablet(s) Oral daily    MEDICATIONS  (PRN):  bisacodyl Suppository 10 milliGRAM(s) Rectal daily PRN Constipation  ondansetron Injectable 4 milliGRAM(s) IV Push every 6 hours PRN Nausea and/or Vomiting      Allergies    asa (Other)  No Known Drug Allergies    Intolerances       Vital Signs Last 24 Hrs  T(C): 37.2 (24 Apr 2021 05:18), Max: 37.2 (24 Apr 2021 05:18)  T(F): 98.9 (24 Apr 2021 05:18), Max: 98.9 (24 Apr 2021 05:18)  HR: 92 (24 Apr 2021 05:18) (66 - 101)  BP: 157/70 (24 Apr 2021 05:18) (154/72 - 185/74)  BP(mean): --  RR: 18 (24 Apr 2021 05:18) (16 - 18)  SpO2: 98% (24 Apr 2021 05:18) (96% - 100%)    PHYSICAL EXAM:  GENERAL: NAD, well-groomed, well-developed  HEAD:  Atraumatic, Normocephalic  EYES: EOMI, PERRLA, conjunctiva and sclera clear  ENMT: No tonsillar erythema, exudates, or enlargement; Moist mucous membranes, Good dentition, No lesions NG tube   NECK: Supple, No JVD, Normal thyroid  NERVOUS SYSTEM:  Alert & Oriented X3, Good concentration; Motor Strength 5/5 B/L upper and lower extremities; DTRs 2+ intact and symmetric  CHEST/LUNG: Clear to percussion bilaterally; No rales, rhonchi, wheezing, or rubs  HEART: Regular rate and rhythm; No murmurs, rubs, or gallops  ABDOMEN: Soft, Nontender, Nondistended; Bowel sounds present Dressing CDI   EXTREMITIES:  2+ Peripheral Pulses, No clubbing, cyanosis, or edema  LYMPH: No lymphadenopathy noted  SKIN: No rashes or lesions  LABS:                        11.5   5.51  )-----------( 277      ( 24 Apr 2021 07:06 )             34.1     04-24    140  |  108  |  9   ----------------------------<  99  3.4<L>   |  24  |  0.75    Ca    9.2      24 Apr 2021 07:06  Phos  3.1     04-24  Mg     2.1     04-24          CAPILLARY BLOOD GLUCOSE      POCT Blood Glucose.: 98 mg/dL (24 Apr 2021 10:41)  POCT Blood Glucose.: 99 mg/dL (24 Apr 2021 06:35)  POCT Blood Glucose.: 109 mg/dL (24 Apr 2021 00:05)  POCT Blood Glucose.: 92 mg/dL (23 Apr 2021 15:46)      RADIOLOGY & ADDITIONAL TESTS:    Imaging Personally Reviewed:  [ X] YES  [ ] NO    Consultant(s) Notes Reviewed:  [ X] YES  [ ] NO    Care Discussed with Consultants/Other Providers [X ] YES  [ ] NO

## 2021-04-25 ENCOUNTER — TRANSCRIPTION ENCOUNTER (OUTPATIENT)
Age: 81
End: 2021-04-25

## 2021-04-25 VITALS
OXYGEN SATURATION: 98 % | HEART RATE: 85 BPM | SYSTOLIC BLOOD PRESSURE: 130 MMHG | RESPIRATION RATE: 18 BRPM | DIASTOLIC BLOOD PRESSURE: 77 MMHG | TEMPERATURE: 98 F

## 2021-04-25 LAB
ANION GAP SERPL CALC-SCNC: 9 MMOL/L — SIGNIFICANT CHANGE UP (ref 5–17)
BUN SERPL-MCNC: 12 MG/DL — SIGNIFICANT CHANGE UP (ref 7–23)
CALCIUM SERPL-MCNC: 9.4 MG/DL — SIGNIFICANT CHANGE UP (ref 8.5–10.1)
CHLORIDE SERPL-SCNC: 106 MMOL/L — SIGNIFICANT CHANGE UP (ref 96–108)
CO2 SERPL-SCNC: 24 MMOL/L — SIGNIFICANT CHANGE UP (ref 22–31)
CREAT SERPL-MCNC: 0.98 MG/DL — SIGNIFICANT CHANGE UP (ref 0.5–1.3)
GLUCOSE BLDC GLUCOMTR-MCNC: 106 MG/DL — HIGH (ref 70–99)
GLUCOSE BLDC GLUCOMTR-MCNC: 110 MG/DL — HIGH (ref 70–99)
GLUCOSE BLDC GLUCOMTR-MCNC: 93 MG/DL — SIGNIFICANT CHANGE UP (ref 70–99)
GLUCOSE SERPL-MCNC: 105 MG/DL — HIGH (ref 70–99)
HCT VFR BLD CALC: 36 % — SIGNIFICANT CHANGE UP (ref 34.5–45)
HGB BLD-MCNC: 12.1 G/DL — SIGNIFICANT CHANGE UP (ref 11.5–15.5)
MAGNESIUM SERPL-MCNC: 2.2 MG/DL — SIGNIFICANT CHANGE UP (ref 1.6–2.6)
MCHC RBC-ENTMCNC: 25.3 PG — LOW (ref 27–34)
MCHC RBC-ENTMCNC: 33.6 GM/DL — SIGNIFICANT CHANGE UP (ref 32–36)
MCV RBC AUTO: 75.3 FL — LOW (ref 80–100)
NRBC # BLD: 0 /100 WBCS — SIGNIFICANT CHANGE UP (ref 0–0)
PHOSPHATE SERPL-MCNC: 3.4 MG/DL — SIGNIFICANT CHANGE UP (ref 2.5–4.5)
PLATELET # BLD AUTO: 312 K/UL — SIGNIFICANT CHANGE UP (ref 150–400)
POTASSIUM SERPL-MCNC: 3.6 MMOL/L — SIGNIFICANT CHANGE UP (ref 3.5–5.3)
POTASSIUM SERPL-SCNC: 3.6 MMOL/L — SIGNIFICANT CHANGE UP (ref 3.5–5.3)
RBC # BLD: 4.78 M/UL — SIGNIFICANT CHANGE UP (ref 3.8–5.2)
RBC # FLD: 14.8 % — HIGH (ref 10.3–14.5)
SODIUM SERPL-SCNC: 139 MMOL/L — SIGNIFICANT CHANGE UP (ref 135–145)
WBC # BLD: 5.22 K/UL — SIGNIFICANT CHANGE UP (ref 3.8–10.5)
WBC # FLD AUTO: 5.22 K/UL — SIGNIFICANT CHANGE UP (ref 3.8–10.5)

## 2021-04-25 PROCEDURE — 99232 SBSQ HOSP IP/OBS MODERATE 35: CPT

## 2021-04-25 RX ADMIN — AMLODIPINE BESYLATE 5 MILLIGRAM(S): 2.5 TABLET ORAL at 05:24

## 2021-04-25 RX ADMIN — Medication 1 TABLET(S): at 05:24

## 2021-04-25 RX ADMIN — HEPARIN SODIUM 5000 UNIT(S): 5000 INJECTION INTRAVENOUS; SUBCUTANEOUS at 05:24

## 2021-04-25 RX ADMIN — PANTOPRAZOLE SODIUM 40 MILLIGRAM(S): 20 TABLET, DELAYED RELEASE ORAL at 08:18

## 2021-04-25 NOTE — DISCHARGE NOTE PROVIDER - CARE PROVIDER_API CALL
hien parsons  Surgery  Critical Care  [address]  [place] [id number]  Phone: [phone]  Fax: [phone]  Phone: (   )    -  Fax: (   )    -  Follow Up Time: 1 week

## 2021-04-25 NOTE — PROGRESS NOTE ADULT - NUTRITIONAL ASSESSMENT
This patient has been assessed with a concern for Malnutrition and has been determined to have a diagnosis/diagnoses of Severe protein-calorie malnutrition.    This patient is being managed with:   Diet Full Liquid-  Entered: Apr 24 2021  7:51AM    
This patient has been assessed with a concern for Malnutrition and has been determined to have a diagnosis/diagnoses of Severe protein-calorie malnutrition.    This patient is being managed with:   Diet Regular-  Consistent Carbohydrate {Evening Snack}  DASH/TLC {Sodium & Cholesterol Restricted}  Entered: Apr 24 2021 11:30AM

## 2021-04-25 NOTE — DISCHARGE NOTE NURSING/CASE MANAGEMENT/SOCIAL WORK - PATIENT PORTAL LINK FT
You can access the FollowMyHealth Patient Portal offered by [facility] by registering at the following website: [email or web address]. By joining [employer] [facility] [employer] [facility] [employer]’s FollowMyHealth portal, you will also be able to view your health information using other applications (apps) compatible with our system.

## 2021-04-25 NOTE — PROGRESS NOTE ADULT - SUBJECTIVE AND OBJECTIVE BOX
Patient is a 80y old  Female who presents with a chief complaint of Abdominal pain and vomiting (25 Apr 2021 08:13)      INTERVAL HPI/OVERNIGHT EVENTS:    MEDICATIONS  (STANDING):  amLODIPine   Tablet 5 milliGRAM(s) Oral daily  dextrose 40% Gel 15 Gram(s) Oral once  dextrose 5%. 1000 milliLiter(s) (100 mL/Hr) IV Continuous <Continuous>  dextrose 5%. 1000 milliLiter(s) (50 mL/Hr) IV Continuous <Continuous>  dextrose 50% Injectable 25 Gram(s) IV Push once  dextrose 50% Injectable 12.5 Gram(s) IV Push once  dextrose 50% Injectable 25 Gram(s) IV Push once  glucagon  Injectable 1 milliGRAM(s) IntraMuscular once  heparin   Injectable 5000 Unit(s) SubCutaneous every 12 hours  insulin lispro (ADMELOG) corrective regimen sliding scale   SubCutaneous every 6 hours  pantoprazole    Tablet 40 milliGRAM(s) Oral before breakfast  triamterene 37.5 mG/hydrochlorothiazide 25 mG Tablet 1 Tablet(s) Oral daily    MEDICATIONS  (PRN):  bisacodyl Suppository 10 milliGRAM(s) Rectal daily PRN Constipation  ondansetron Injectable 4 milliGRAM(s) IV Push every 6 hours PRN Nausea and/or Vomiting      Allergies    asa (Other)  No Known Drug Allergies    Intolerances        REVIEW OF SYSTEMS:  CONSTITUTIONAL: No fever, weight loss, or fatigue  EYES: No eye pain, visual disturbances, or discharge  ENMT:  No difficulty hearing, tinnitus, vertigo; No sinus or throat pain  NECK: No pain or stiffness  BREASTS: No pain, masses, or nipple discharge  RESPIRATORY: No cough, wheezing, chills or hemoptysis; No shortness of breath  CARDIOVASCULAR: No chest pain, palpitations, dizziness, or leg swelling  GASTROINTESTINAL: No abdominal or epigastric pain. No nausea, vomiting, or hematemesis; No diarrhea or constipation. No melena or hematochezia.  GENITOURINARY: No dysuria, frequency, hematuria, or incontinence  NEUROLOGICAL: No headaches, memory loss, loss of strength, numbness, or tremors  SKIN: No itching, burning, rashes, or lesions   LYMPH NODES: No enlarged glands  ENDOCRINE: No heat or cold intolerance; No hair loss  MUSCULOSKELETAL: No joint pain or swelling; No muscle, back, or extremity pain  PSYCHIATRIC: No depression, anxiety, mood swings, or difficulty sleeping  HEME/LYMPH: No easy bruising, or bleeding gums  ALLERGY AND IMMUNOLOGIC: No hives or eczema    Vital Signs Last 24 Hrs  T(C): 37.1 (25 Apr 2021 05:13), Max: 37.3 (24 Apr 2021 18:20)  T(F): 98.7 (25 Apr 2021 05:13), Max: 99.1 (24 Apr 2021 18:20)  HR: 93 (25 Apr 2021 05:13) (81 - 97)  BP: 137/73 (25 Apr 2021 05:13) (137/73 - 157/73)  BP(mean): --  RR: 18 (25 Apr 2021 05:13) (17 - 18)  SpO2: 99% (25 Apr 2021 05:13) (97% - 99%)    PHYSICAL EXAM:  GENERAL: NAD, well-groomed, well-developed  HEAD:  Atraumatic, Normocephalic  EYES: EOMI, PERRLA, conjunctiva and sclera clear  ENMT: No tonsillar erythema, exudates, or enlargement; Moist mucous membranes, Good dentition, No lesions NG tube   NECK: Supple, No JVD, Normal thyroid  NERVOUS SYSTEM:  Alert & Oriented X3, Good concentration; Motor Strength 5/5 B/L upper and lower extremities; DTRs 2+ intact and symmetric  CHEST/LUNG: Clear to percussion bilaterally; No rales, rhonchi, wheezing, or rubs  HEART: Regular rate and rhythm; No murmurs, rubs, or gallops  ABDOMEN: Soft, Nontender, Nondistended; Bowel sounds present Dressing CDI   EXTREMITIES:  2+ Peripheral Pulses, No clubbing, cyanosis, or edema  LYMPH: No lymphadenopathy noted  SKIN: No rashes or lesions    LABS:                        12.1   5.22  )-----------( 312      ( 25 Apr 2021 08:38 )             36.0     04-25    139  |  106  |  12  ----------------------------<  105<H>  3.6   |  24  |  0.98    Ca    9.4      25 Apr 2021 08:38  Phos  3.4     04-25  Mg     2.2     04-25          CAPILLARY BLOOD GLUCOSE      POCT Blood Glucose.: 106 mg/dL (25 Apr 2021 10:38)  POCT Blood Glucose.: 93 mg/dL (25 Apr 2021 06:23)  POCT Blood Glucose.: 110 mg/dL (25 Apr 2021 00:01)  POCT Blood Glucose.: 83 mg/dL (24 Apr 2021 15:47)      RADIOLOGY & ADDITIONAL TESTS:    Imaging Personally Reviewed:  [ X] YES  [ ] NO    Consultant(s) Notes Reviewed:  [ X] YES  [ ] NO    Care Discussed with Consultants/Other Providers [X ] YES  [ ] NO

## 2021-04-25 NOTE — PROGRESS NOTE ADULT - ASSESSMENT
Impression: 80F PMH DM, HTN, s/p ex lap, FENG 2/2021, a/w closed loop SBO POD#2 s/p ex lap, FENG, reduction of internal hernia. -Flatus/-BM.     Plan  -Continue to monitor for bowel function.   -NGT to LWS. NPO, IVF.   -Encouraged OOB to chair today. PT recs home PT.  -Local wound care per surgery team.   -F/u am labs.   -DVT prophylaxis.   -Will discuss with surgical attending.     
Impression: 80F PMH DM, HTN, s/p ex lap, FENG 2/2021, a/w closed loop SBO POD#4 s/p ex lap, FENG, reduction of internal hernia- stable    Plan  -Continue to monitor for bowel function.   - NPO, IVF.   -cont oob to ambulation with walker  -Incentive spirometer  -F/u am labs.   -DVT prophylaxis.   -medical consult appreciated  -Will discuss with surgical attending.  
80F PMH DM, HTN, s/p ex lap, FENG 2/2021, a/w closed loop SBO         SBO  POD#4 s/p ex lap, FENG, reduction of internal hernia- management per surgery    Elevated BP   -has been off home meds due to npo  - ivp hydralazine w parameters, already on metoprolol  monitor bp --improving     DM   sugars, stable  c/w ivf  resume meds when tolerating po   
80F PMH DM, HTN, s/p ex lap, FENG 2/2021, a/w closed loop SBO         SBO  POD#6 s/p ex lap, FENG, reduction of internal hernia- management per surgery    Elevated BP   -resume po meds when tolerating diet   monitor bp --improving     DM   sugars, stable  c/w ivf  resume meds when tolerating po   
80F PMH DM, HTN, s/p ex lap, FENG 2/2021, a/w closed loop SBO         SBO  POD#5 s/p ex lap, FENG, reduction of internal hernia- management per surgery    Elevated BP   -resume po meds when tolerating diet   - ivp hydralazine w parameters, already on metoprolol  monitor bp --improving     DM   sugars, stable  c/w ivf  resume meds when tolerating po

## 2021-04-25 NOTE — DISCHARGE NOTE PROVIDER - HOSPITAL COURSE
Patient is a 81 y/o female w/PMH of HTN, DMII and PSH s/p hysterectomy, breast cyst removal bilateral, and s/p ex-lap w/appendectomy, peritoneal lavage, and lysis of adhesions (2/9/2021) presented to ED for two days of abdominal pain, vomiting for one day, and unable to pass bowel movements the last day.   CT in the ED showed SBO, possible closed loop.  Patient was taken to the OR emergently on 4/19 and underwent an ex lap, extensive FENG, abdominal washout.  Post op, patient's pain was controlled and her NGT was removed once she had bowel function.  When patient was tolerating a regular diet, had normal bowel function, and was pain-free, she was discharged home. Patient is a 81 y/o female w/PMH of HTN, DMII and PSH s/p hysterectomy, breast cyst removal bilateral, and s/p ex-lap w/appendectomy, peritoneal lavage, and lysis of adhesions (2/9/2021) presented to ED for two days of abdominal pain, vomiting for one day, and unable to pass bowel movements the last day.   CT in the ED showed SBO, possible closed loop.  Patient was taken to the OR emergently on 4/19 and underwent an ex lap, extensive FENG, abdominal washout.  Post op, patient's pain was controlled and her NGT was removed once she had bowel function.  Of note, patient was diagnosed with protein calorie malnutrition. Patient was seen and counseled by Nutritionist.  When patient was tolerating a regular diet, had normal bowel function, and was pain-free, she was discharged home.

## 2021-04-25 NOTE — DISCHARGE NOTE PROVIDER - NSDCMRMEDTOKEN_GEN_ALL_CORE_FT
amLODIPine 5 mg oral tablet: 1 tab(s) orally once a day  metFORMIN 500 mg oral tablet: 1 tab(s) orally 2 times a day  triamterene-hydrochlorothiazide 37.5 mg-25 mg oral capsule: 1 cap(s) orally once a day

## 2021-04-25 NOTE — PROGRESS NOTE ADULT - SUBJECTIVE AND OBJECTIVE BOX
Patient seen and examined at bedside resting comfortably. No acute issues overnight. BP much better after resuming home meds.   No complaints offered. +flatus and BM   Abdominal pain is well controlled.  Denies nausea and vomiting. Tolerating regular diet.  Denies chest pain, dyspnea, cough.    T(F): 98.7 (04-25-21 @ 05:13), Max: 99.1 (04-24-21 @ 18:20)  HR: 93 (04-25-21 @ 05:13) (81 - 97)  BP: 137/73 (04-25-21 @ 05:13) (137/73 - 157/73)  RR: 18 (04-25-21 @ 05:13) (17 - 18)  SpO2: 99% (04-25-21 @ 05:13) (97% - 99%)  Wt(kg): --  CAPILLARY BLOOD GLUCOSE      POCT Blood Glucose.: 93 mg/dL (25 Apr 2021 06:23)  POCT Blood Glucose.: 110 mg/dL (25 Apr 2021 00:01)  POCT Blood Glucose.: 83 mg/dL (24 Apr 2021 15:47)  POCT Blood Glucose.: 98 mg/dL (24 Apr 2021 10:41)    Physical Exam  GENERAL: Alert, NAD  HEENT: Normocephalic, EOMI  CHEST/LUNG: Respirations nonlabored  HEART: Regular rate and rhythm  ABDOMEN: incision with staples intact, no active bleeding or signs of infection. + bowel sounds, soft, Nontender, Nondistended, no rebound or guarding noted  EXTREMITIES:  no calf tenderness, No edema    LABS:      I&O's Detail    23 Apr 2021 07:01  -  24 Apr 2021 07:00  --------------------------------------------------------  IN:    dextrose 5% + sodium chloride 0.45% w/ Additives: 900 mL  Total IN: 900 mL    OUT:  Total OUT: 0 mL    Total NET: 900 mL      24 Apr 2021 07:01  -  25 Apr 2021 06:42  --------------------------------------------------------  IN:    Oral Fluid: 720 mL  Total IN: 720 mL    OUT:  Total OUT: 0 mL    Total NET: 720 mL        Culture Results:   10,000 - 49,000 CFU/mL Proteus mirabilis (04-19 @ 11:16)    Impression: 80F PMH DM, HTN, s/p ex lap, FENG 2/2021, a/w closed loop SBO POD#6 s/p ex lap, FENG, reduction of internal hernia- stable    Plan  -cont regular diet  -cont oob to ambulation with walker  -Incentive spirometer  -DVT prophylaxis.   -d/c planning to home today  with home PT  -Will discuss with surgical attending.

## 2021-04-25 NOTE — PROGRESS NOTE ADULT - REASON FOR ADMISSION
Abdominal pain and vomiting

## 2021-04-25 NOTE — PROGRESS NOTE ADULT - NSICDXPILOT_GEN_ALL_CORE
[place]

## 2021-04-25 NOTE — DISCHARGE NOTE PROVIDER - NSDCFUADDINST_GEN_ALL_CORE_FT
Drink plenty of fluids to prevent constipation and fruit juices without pulp that are high in vitamin C. Rest as needed. Do not lift anything heavier than 10 pounds. You may take tylenol or advil for mild pain as needed. You may take over the counter stool softeners as needed. Call for any fever over 101, nausea, vomiting, severe pain, no passing of gas or bowel movement. You may shower and pat dry abdomen.

## 2021-04-25 NOTE — DISCHARGE NOTE PROVIDER - DETAILS OF MALNUTRITION DIAGNOSIS/DIAGNOSES
This patient has been assessed with a concern for Malnutrition and was treated during this hospitalization for the following Nutrition diagnosis/diagnoses:     -  04/23/2021: Severe protein-calorie malnutrition

## 2021-04-25 NOTE — PROGRESS NOTE ADULT - PROVIDER SPECIALTY LIST ADULT
Gastroenterology
Gastroenterology
Surgery
Surgery
Gastroenterology
Surgery
Gastroenterology
Surgery
Surgery
Gastroenterology
Hospitalist
Surgery
Surgery
Hospitalist
Hospitalist

## 2021-04-25 NOTE — DISCHARGE NOTE PROVIDER - PROVIDER TOKENS
FREE:[LAST:[shterental],FIRST:[hien],PHONE:[(   )    -],FAX:[(   )    -],ADDRESS:[Surgery  [facility]  [address] [place] [address], [address] [place] [address]  Phone: [phone]  Fax: [phone]],FOLLOWUP:[1 week]]

## 2021-05-04 NOTE — PATIENT PROFILE ADULT - HISTORY OF COVID-19 VACCINATION
Attempted to call pt left message for pt to return call. Writer called pharmacy to verify that pt has picked up ABX. Mailed unable to reach letter.    Yes

## 2021-05-05 ENCOUNTER — APPOINTMENT (OUTPATIENT)
Dept: SURGERY | Facility: CLINIC | Age: 81
End: 2021-05-05
Payer: MEDICARE

## 2021-05-05 VITALS
DIASTOLIC BLOOD PRESSURE: 72 MMHG | BODY MASS INDEX: 25.4 KG/M2 | TEMPERATURE: 97.6 F | HEART RATE: 98 BPM | SYSTOLIC BLOOD PRESSURE: 132 MMHG | HEIGHT: 62 IN | WEIGHT: 138 LBS | OXYGEN SATURATION: 100 %

## 2021-05-05 DIAGNOSIS — E78.5 HYPERLIPIDEMIA, UNSPECIFIED: ICD-10-CM

## 2021-05-05 DIAGNOSIS — K56.50 INTESTINAL ADHESIONS [BANDS], UNSPECIFIED AS TO PARTIAL VERSUS COMPLETE OBSTRUCTION: ICD-10-CM

## 2021-05-05 DIAGNOSIS — I10 ESSENTIAL (PRIMARY) HYPERTENSION: ICD-10-CM

## 2021-05-05 DIAGNOSIS — Z90.710 ACQUIRED ABSENCE OF BOTH CERVIX AND UTERUS: ICD-10-CM

## 2021-05-05 DIAGNOSIS — E11.9 TYPE 2 DIABETES MELLITUS WITHOUT COMPLICATIONS: ICD-10-CM

## 2021-05-05 DIAGNOSIS — R10.9 UNSPECIFIED ABDOMINAL PAIN: ICD-10-CM

## 2021-05-05 DIAGNOSIS — N17.9 ACUTE KIDNEY FAILURE, UNSPECIFIED: ICD-10-CM

## 2021-05-05 DIAGNOSIS — E43 UNSPECIFIED SEVERE PROTEIN-CALORIE MALNUTRITION: ICD-10-CM

## 2021-05-05 PROCEDURE — 99024 POSTOP FOLLOW-UP VISIT: CPT

## 2021-05-05 NOTE — HISTORY OF PRESENT ILLNESS
[de-identified] : Patient is a 79 y/o female w/PMH of HTN, DMII and PSH s/p hysterectomy, breast \par cyst removal bilateral, and s/p ex-lap w/appendectomy, peritoneal lavage, and \par lysis of adhesions (2/9/2021) presented to ED for two days of abdominal pain, \par vomiting for one day, and unable to pass bowel movements the last day. \par CT in the ED showed SBO, possible closed loop. \par Patient was taken to the OR emergently on 4/19 and underwent an ex lap, \par extensive FENG, abdominal washout. \par Post op, patient's pain was controlled and her NGT was removed once she had \par bowel function. \par Of note, patient was diagnosed with protein calorie malnutrition. Patient was \par seen and counseled by Nutritionist. \par When patient was tolerating a regular diet, had normal bowel function, and was \par pain-free, she was discharged home.  [de-identified] : Patient reports pain has resolved, denies nausea/vomiting, reports regular BM's. Reports poor appetite. No other complaints. The rest of the 10 pt ROS negative.

## 2021-05-05 NOTE — PHYSICAL EXAM
[Calm] : calm [de-identified] : Thin with temple wasting, no acute distress [de-identified] : Abdomen soft, non tender, non distended. Staples removed.

## 2021-05-05 NOTE — ASSESSMENT
[FreeTextEntry1] : 81 yo female s/p ex lap FENG for closed loop SBO. Patient is having regular GI function however continues to have poor appetite. Of note, in the hospital she was diagnosed with protein calorie malnutrition. I informed patient and her daughter of this and encouraged her to prioritize nutritionally high-yield foods. The patient has had Ensure in the past and is willing to resume drinking daily. All questions were answered.\par Follow up with me PRN.

## 2021-12-17 NOTE — PHYSICAL THERAPY INITIAL EVALUATION ADULT - ADDITIONAL COMMENTS
Caller: Dior Pettit    Relationship: Self    Best call back number: [phone]    Requested Prescriptions:   Requested Prescriptions     Pending Prescriptions Disp Refills   • fluticasone-salmeterol (Advair Diskus) 250-50 MCG/DOSE DISKUS 60 each      Sig: Inhale As Needed.        Pharmacy where request should be sent: [facility] - [place], [place] - [address] AT PORTAL TO [facility] [phone] [facility] [phone]      Additional details provided by patient: PATIENT HAS OVER A WEEK SUPPLY     Does the patient have less than a 3 day supply:  [] Yes  [x] No    Ritesh Manriquez Rep   12/17/21 11:15 EST            Pt lives in a pvt house with  with 3 steps to enter and 1 flight to bed room Pt lives in a pvt house with  with 3 steps to enter and 1 flight to bed room. Pt states she has a RW at home in good condition she can use.

## 2022-02-08 ENCOUNTER — TRANSCRIPTION ENCOUNTER (OUTPATIENT)
Age: 82
End: 2022-02-08

## 2022-04-14 NOTE — ED PROVIDER NOTE - OBJECTIVE STATEMENT
Patient
80 year old female with PMH of DM II, HTN and recent SBO with lysis of adhesions in february - presenting to ED due to nausea/vomiting and abd pain - band-like distribution x 2 days. Pt otherwise denies any fever/chills. Pain has been constant and worsening.

## 2022-11-21 NOTE — ED ADULT NURSE NOTE - NSFALLRSKASSESASSIST_ED_ALL_ED
[address]  [address]  [place], [address]  [phone]                                History and Physical     NAME: Georges Orozco   :  1954   MRN:  [id number]     HPI:  The patient was seen and examined. Past Surgical History:   Procedure Laterality Date    HX GI      colonoscopy/EGD    HX HEENT      dental work     Past Medical History:   Diagnosis Date    Adverse effect of anesthesia     really cold    GERD (gastroesophageal reflux disease)     Hypertension     Nausea & vomiting     after released from endoscopy pt was sent to her PCP straight from the hospital - PCP \"did not say much\" per pt/was told by [facility] nurse that \"sometimes women have trouble with prep and anesthesia together\"/stopped the EGD d/t maybe an oxygen problem     Social History     Tobacco Use    Smoking status: Never    Smokeless tobacco: Never   Vaping Use    Vaping Use: Never used   Substance Use Topics    Alcohol use: Yes     Comment: rarely    Drug use: Never     No Known Allergies  Family History   Problem Relation Age of Onset    Breast Cancer Mother     Melanoma Mother     Heart Attack Father     Breast Cancer Maternal Aunt      No current facility-administered medications for this encounter. PHYSICAL EXAM:  General: WD, WN. Alert, cooperative, no acute distress    HEENT: NC, Atraumatic. PERRLA, EOMI. Anicteric sclerae. Lungs:  CTA Bilaterally. No Wheezing/Rhonchi/Rales. Heart:  Regular  rhythm,  No murmur, No Rubs, No Gallops  Abdomen: Soft, Non distended, Non tender. +Bowel sounds, no HSM  Extremities: No c/c/e  Neurologic:  CN 2-12 gi, Alert and oriented X 3. No acute neurological distress   Psych:   Good insight. Not anxious nor agitated. The heart, lungs and mental status were satisfactory for the administration of mac sedation and for the procedure.       Mallampati score: 2     The patient was counseled at length about the risks of lily Covid-19 in the carie-operative and post-operative states including the recovery window of their procedure. The patient was made aware that lily Covid-19 after a surgical procedure may worsen their prognosis for recovering from the virus and lend to a higher morbidity and or mortality risk. The patient was given the options of postponing their procedure. All of the risks, benefits, and alternatives were discussed. The patient does wish to proceed with the procedure.       Assessment:   Gerd/screening    Plan:   Endoscopic procedure:  EGD with Bravo pH probe - OFF PPI for 1 week  (bx throughout EGD)  Colonoscopy  w TI intubation and random bx  MAC sedation no

## 2024-05-02 NOTE — ED ADULT NURSE NOTE - NS TRANSFER PATIENT BELONGINGS
What to Expect After a Cystoscopy  For the next 24-48 hours, you may feel a mild burning when you urinate. This burning is normal and expected. Drink plenty of water to dilute the urine to help relieve the burning sensation. You may also see a small amount of blood in your urine after the procedure.    Unless you are already taking antibiotics, you may be given an antibiotic after the test to prevent infection.    Signs and Symptoms to Report  Call the [facility] at [phone] if you develop any of the following:  Fever of 101 degrees or higher  Chills or persistent bleeding  Inability to urinate  
Clothing

## 2024-08-08 NOTE — ED ADULT TRIAGE NOTE - AS HEIGHT TYPE
PA for   tirzepatide (Zepbound) 2.5 mg   Approved     Date(s) approved 8/7/24 to 8/7/25      Patient advised by          [] MyChart Message  [x] Phone call: Medication covered under the insurance. Patient going to get a coupon card to bring the copay down even further.  []LMOM  []L/M to call office as no active Communication consent on file  []Unable to leave detailed message as VM not approved on Communication consent       Pharmacy advised by    []Fax  [x]Phone call: Covered under insurance. The pharmacy will fill once patient has a coupon card as well.    Approval letter scanned into Media Yes      stated

## 2025-09-02 ENCOUNTER — APPOINTMENT (OUTPATIENT)
Dept: INTERNAL MEDICINE | Facility: CLINIC | Age: 85
End: 2025-09-02
Payer: MEDICARE

## 2025-09-02 VITALS — SYSTOLIC BLOOD PRESSURE: 160 MMHG | HEART RATE: 72 BPM | DIASTOLIC BLOOD PRESSURE: 72 MMHG

## 2025-09-02 VITALS
WEIGHT: 154 LBS | BODY MASS INDEX: 28.17 KG/M2 | OXYGEN SATURATION: 99 % | SYSTOLIC BLOOD PRESSURE: 154 MMHG | DIASTOLIC BLOOD PRESSURE: 71 MMHG | HEART RATE: 94 BPM | TEMPERATURE: 97.4 F

## 2025-09-02 DIAGNOSIS — E78.5 HYPERLIPIDEMIA, UNSPECIFIED: ICD-10-CM

## 2025-09-02 DIAGNOSIS — R19.8 OTHER SPECIFIED SYMPTOMS AND SIGNS INVOLVING THE DIGESTIVE SYSTEM AND ABDOMEN: ICD-10-CM

## 2025-09-02 DIAGNOSIS — I10 ESSENTIAL (PRIMARY) HYPERTENSION: ICD-10-CM

## 2025-09-02 DIAGNOSIS — E11.9 TYPE 2 DIABETES MELLITUS W/OUT COMPLICATIONS: ICD-10-CM

## 2025-09-02 DIAGNOSIS — K21.9 GASTRO-ESOPHAGEAL REFLUX DISEASE W/OUT ESOPHAGITIS: ICD-10-CM

## 2025-09-02 LAB
ALBUMIN SERPL ELPH-MCNC: 4.7 G/DL
ALP BLD-CCNC: 117 U/L
ALT SERPL-CCNC: 14 U/L
ANION GAP SERPL CALC-SCNC: 15 MMOL/L
AST SERPL-CCNC: 17 U/L
BASOPHILS # BLD AUTO: 0.04 K/UL
BASOPHILS NFR BLD AUTO: 0.9 %
BILIRUB SERPL-MCNC: 0.4 MG/DL
BUN SERPL-MCNC: 22 MG/DL
CALCIUM SERPL-MCNC: 10.5 MG/DL
CHLORIDE SERPL-SCNC: 100 MMOL/L
CHOLEST SERPL-MCNC: 181 MG/DL
CK SERPL-CCNC: 67 U/L
CO2 SERPL-SCNC: 26 MMOL/L
CREAT SERPL-MCNC: 0.98 MG/DL
EGFRCR SERPLBLD CKD-EPI 2021: 57 ML/MIN/1.73M2
EOSINOPHIL # BLD AUTO: 0.05 K/UL
EOSINOPHIL NFR BLD AUTO: 1.1 %
ESTIMATED AVERAGE GLUCOSE: 111 MG/DL
GLUCOSE SERPL-MCNC: 129 MG/DL
HBA1C MFR BLD HPLC: 5.5 %
HCT VFR BLD CALC: 37 %
HDLC SERPL-MCNC: 75 MG/DL
HGB BLD-MCNC: 12.4 G/DL
IMM GRANULOCYTES NFR BLD AUTO: 0.2 %
LDLC SERPL-MCNC: 94 MG/DL
LYMPHOCYTES # BLD AUTO: 1.15 K/UL
LYMPHOCYTES NFR BLD AUTO: 25.5 %
MAN DIFF?: NORMAL
MCHC RBC-ENTMCNC: 26.4 PG
MCHC RBC-ENTMCNC: 33.5 G/DL
MCV RBC AUTO: 78.7 FL
MONOCYTES # BLD AUTO: 0.33 K/UL
MONOCYTES NFR BLD AUTO: 7.3 %
NEUTROPHILS # BLD AUTO: 2.93 K/UL
NEUTROPHILS NFR BLD AUTO: 65 %
NONHDLC SERPL-MCNC: 107 MG/DL
PLATELET # BLD AUTO: 232 K/UL
POTASSIUM SERPL-SCNC: 3.8 MMOL/L
PROT SERPL-MCNC: 7.3 G/DL
RBC # BLD: 4.7 M/UL
RBC # FLD: 14.3 %
SODIUM SERPL-SCNC: 140 MMOL/L
TRIGL SERPL-MCNC: 68 MG/DL
WBC # FLD AUTO: 4.51 K/UL

## 2025-09-02 PROCEDURE — 36415 COLL VENOUS BLD VENIPUNCTURE: CPT

## 2025-09-02 PROCEDURE — 99203 OFFICE O/P NEW LOW 30 MIN: CPT

## 2025-09-02 RX ORDER — SIMETHICONE 125 MG/1
125 CAPSULE, LIQUID FILLED ORAL
Qty: 60 | Refills: 0 | Status: ACTIVE | COMMUNITY
Start: 2025-09-02

## 2025-09-02 RX ORDER — FAMOTIDINE 20 MG/1
20 TABLET, FILM COATED ORAL DAILY
Qty: 30 | Refills: 0 | Status: ACTIVE | COMMUNITY
Start: 2025-09-02

## 2025-09-02 RX ORDER — VALSARTAN 80 MG/1
80 TABLET, COATED ORAL DAILY
Qty: 30 | Refills: 3 | Status: ACTIVE | COMMUNITY
Start: 2025-09-02 | End: 1900-01-01

## 2025-09-02 RX ORDER — TRIAMTERENE AND HYDROCHLOROTHIAZIDE 37.5; 25 MG/1; MG/1
37.5-25 CAPSULE ORAL DAILY
Qty: 30 | Refills: 0 | Status: ACTIVE | COMMUNITY
Start: 2025-09-02

## 2025-09-02 RX ORDER — FELODIPINE 5 MG/1
5 TABLET, EXTENDED RELEASE ORAL DAILY
Qty: 30 | Refills: 0 | Status: ACTIVE | COMMUNITY
Start: 2025-09-02

## 2025-09-02 RX ORDER — POTASSIUM CHLORIDE 1500 MG/1
20 TABLET, FILM COATED, EXTENDED RELEASE ORAL DAILY
Qty: 30 | Refills: 0 | Status: DISCONTINUED | COMMUNITY
Start: 2025-09-02 | End: 2025-09-02

## 2025-09-02 RX ORDER — METFORMIN HYDROCHLORIDE 500 MG/1
500 TABLET, COATED ORAL
Qty: 60 | Refills: 0 | Status: ACTIVE | COMMUNITY
Start: 2025-09-02

## 2025-09-02 RX ORDER — ATORVASTATIN CALCIUM 10 MG/1
10 TABLET, FILM COATED ORAL
Qty: 30 | Refills: 0 | Status: ACTIVE | COMMUNITY
Start: 2025-09-02